# Patient Record
Sex: FEMALE | HISPANIC OR LATINO | Employment: UNEMPLOYED | URBAN - METROPOLITAN AREA
[De-identification: names, ages, dates, MRNs, and addresses within clinical notes are randomized per-mention and may not be internally consistent; named-entity substitution may affect disease eponyms.]

---

## 2021-01-25 ENCOUNTER — APPOINTMENT (OUTPATIENT)
Dept: RADIOLOGY | Facility: MEDICAL CENTER | Age: 31
End: 2021-01-25
Attending: EMERGENCY MEDICINE
Payer: MEDICAID

## 2021-01-25 ENCOUNTER — HOSPITAL ENCOUNTER (EMERGENCY)
Facility: MEDICAL CENTER | Age: 31
End: 2021-01-25
Attending: EMERGENCY MEDICINE
Payer: MEDICAID

## 2021-01-25 VITALS
OXYGEN SATURATION: 99 % | WEIGHT: 158.29 LBS | HEIGHT: 62 IN | DIASTOLIC BLOOD PRESSURE: 69 MMHG | HEART RATE: 64 BPM | TEMPERATURE: 97.3 F | RESPIRATION RATE: 16 BRPM | SYSTOLIC BLOOD PRESSURE: 93 MMHG | BODY MASS INDEX: 29.13 KG/M2

## 2021-01-25 DIAGNOSIS — O20.0 ABORTION, THREATENED: ICD-10-CM

## 2021-01-25 LAB
ALBUMIN SERPL BCP-MCNC: 4.5 G/DL (ref 3.2–4.9)
ALBUMIN/GLOB SERPL: 1.7 G/DL
ALP SERPL-CCNC: 43 U/L (ref 30–99)
ALT SERPL-CCNC: 11 U/L (ref 2–50)
ANION GAP SERPL CALC-SCNC: 11 MMOL/L (ref 7–16)
APPEARANCE UR: CLEAR
AST SERPL-CCNC: 12 U/L (ref 12–45)
B-HCG SERPL-ACNC: ABNORMAL MIU/ML (ref 0–10)
BASOPHILS # BLD AUTO: 0.6 % (ref 0–1.8)
BASOPHILS # BLD: 0.03 K/UL (ref 0–0.12)
BILIRUB SERPL-MCNC: 0.4 MG/DL (ref 0.1–1.5)
BILIRUB UR QL STRIP.AUTO: NEGATIVE
BUN SERPL-MCNC: 8 MG/DL (ref 8–22)
CALCIUM SERPL-MCNC: 9.3 MG/DL (ref 8.4–10.2)
CHLORIDE SERPL-SCNC: 104 MMOL/L (ref 96–112)
CO2 SERPL-SCNC: 22 MMOL/L (ref 20–33)
COLOR UR: YELLOW
CREAT SERPL-MCNC: 0.52 MG/DL (ref 0.5–1.4)
EOSINOPHIL # BLD AUTO: 0.02 K/UL (ref 0–0.51)
EOSINOPHIL NFR BLD: 0.4 % (ref 0–6.9)
EPI CELLS #/AREA URNS HPF: ABNORMAL /HPF
ERYTHROCYTE [DISTWIDTH] IN BLOOD BY AUTOMATED COUNT: 44 FL (ref 35.9–50)
GLOBULIN SER CALC-MCNC: 2.7 G/DL (ref 1.9–3.5)
GLUCOSE SERPL-MCNC: 92 MG/DL (ref 65–99)
GLUCOSE UR STRIP.AUTO-MCNC: NEGATIVE MG/DL
HCT VFR BLD AUTO: 44.3 % (ref 37–47)
HGB BLD-MCNC: 14.9 G/DL (ref 12–16)
IMM GRANULOCYTES # BLD AUTO: 0.01 K/UL (ref 0–0.11)
IMM GRANULOCYTES NFR BLD AUTO: 0.2 % (ref 0–0.9)
KETONES UR STRIP.AUTO-MCNC: NEGATIVE MG/DL
LEUKOCYTE ESTERASE UR QL STRIP.AUTO: NEGATIVE
LYMPHOCYTES # BLD AUTO: 2.16 K/UL (ref 1–4.8)
LYMPHOCYTES NFR BLD: 40.5 % (ref 22–41)
MCH RBC QN AUTO: 29.7 PG (ref 27–33)
MCHC RBC AUTO-ENTMCNC: 33.6 G/DL (ref 33.6–35)
MCV RBC AUTO: 88.2 FL (ref 81.4–97.8)
MICRO URNS: ABNORMAL
MONOCYTES # BLD AUTO: 0.41 K/UL (ref 0–0.85)
MONOCYTES NFR BLD AUTO: 7.7 % (ref 0–13.4)
MUCOUS THREADS #/AREA URNS HPF: ABNORMAL /HPF
NEUTROPHILS # BLD AUTO: 2.7 K/UL (ref 2–7.15)
NEUTROPHILS NFR BLD: 50.6 % (ref 44–72)
NITRITE UR QL STRIP.AUTO: NEGATIVE
NRBC # BLD AUTO: 0 K/UL
NRBC BLD-RTO: 0 /100 WBC
NUMBER OF RH DOSES IND 8505RD: NORMAL
PH UR STRIP.AUTO: 6.5 [PH] (ref 5–8)
PLATELET # BLD AUTO: 251 K/UL (ref 164–446)
PMV BLD AUTO: 9.6 FL (ref 9–12.9)
POTASSIUM SERPL-SCNC: 4.1 MMOL/L (ref 3.6–5.5)
PROT SERPL-MCNC: 7.2 G/DL (ref 6–8.2)
PROT UR QL STRIP: NEGATIVE MG/DL
RBC # BLD AUTO: 5.02 M/UL (ref 4.2–5.4)
RBC # URNS HPF: ABNORMAL /HPF
RBC UR QL AUTO: ABNORMAL
RH BLD: NORMAL
SODIUM SERPL-SCNC: 137 MMOL/L (ref 135–145)
SP GR UR STRIP.AUTO: 1.01
WBC # BLD AUTO: 5.3 K/UL (ref 4.8–10.8)
WBC #/AREA URNS HPF: ABNORMAL /HPF

## 2021-01-25 PROCEDURE — 80053 COMPREHEN METABOLIC PANEL: CPT

## 2021-01-25 PROCEDURE — 81001 URINALYSIS AUTO W/SCOPE: CPT

## 2021-01-25 PROCEDURE — 85025 COMPLETE CBC W/AUTO DIFF WBC: CPT

## 2021-01-25 PROCEDURE — 36415 COLL VENOUS BLD VENIPUNCTURE: CPT

## 2021-01-25 PROCEDURE — 86901 BLOOD TYPING SEROLOGIC RH(D): CPT

## 2021-01-25 PROCEDURE — 76801 OB US < 14 WKS SINGLE FETUS: CPT

## 2021-01-25 PROCEDURE — 99284 EMERGENCY DEPT VISIT MOD MDM: CPT

## 2021-01-25 PROCEDURE — 84702 CHORIONIC GONADOTROPIN TEST: CPT

## 2021-01-25 NOTE — ED TRIAGE NOTES
Pregnant and vaginal bleeding started Saturday. Spotting only now and no cramping. Patient to bathroom for urine sample.

## 2021-01-25 NOTE — ED NOTES
1432:  PIV placed in LAC, blood drawn and sent to lab.  Pt updated on POC including pending tests and chart review by ERP.  Pt denies any needs at this time.

## 2021-01-25 NOTE — ED PROVIDER NOTES
"ED Provider Note    CHIEF COMPLAINT  Chief Complaint   Patient presents with   • Pregnancy Problem     Vaginal bleeding started this weekend  LMP - 12-15-20       [facility]  Frances Rosen is a 30 y.o. female who presents for evaluation of pregnancy and bleeding.  The patient is G2, P0 Ab1 whose LMP was 12/5/2020.  Patient states that over the last couple days she has been having some vaginal bleeding.  She has not passed any large clots or tissue that she is aware of.  She has had 1 previous miscarriage.  The patient has had some polyuria but no dysuria.  She denies: Fever, chills, URI symptoms, cardiorespiratory symptoms, gastrointestinal symptoms.  No other acute symptomatology or complaints.    REVIEW OF SYSTEMS  See [facility] for further details.  She denies major health problems such as: Hypertension, diabetes, seizures, cardiopulmonary disorders, gastrointestinal disorders, genitourinary disorders.  All other systems negative.    PAST MEDICAL HISTORY  History reviewed. No pertinent past medical history.    FAMILY HISTORY  History reviewed. No pertinent family history.    SOCIAL HISTORY  No history of: tobacco, alcohol, drug abuse    SURGICAL HISTORY  History reviewed. No pertinent surgical history.    CURRENT MEDICATIONS  See nurses notes    ALLERGIES  No Known Allergies    PHYSICAL EXAM  VITAL SIGNS: /64   Pulse 72   Temp 36.3 °C (97.3 °F) (Temporal)   Resp 16   Ht 1.575 m (5' 2\")   Wt 71.8 kg (158 lb 4.6 oz)   LMP 12/15/2020   SpO2 100%   BMI 28.95 kg/m²    Constitutional: 30-year-old female, well developed, Well nourished, No acute distress, Non-toxic appearance.   HENT: ,Atraumatic, Bilateral external ears normal, tympanic membranes clear, Oropharynx moist, No oral exudates, Nose normal.   Eyes: PERRL, EOMI, Conjunctiva normal, No discharge.   Neck: Normal range of motion, No tenderness, Supple, No stridor.   Lymphatic: No lymphadenopathy noted.   Cardiovascular: Normal heart rate, Normal " rhythm, No murmurs, No rubs, No gallops.   Thorax & Lungs: Normal Equal breath sounds, No respiratory distress, No wheezing, no stridor, no rales. No chest tenderness.   Abdomen: Soft, nontender, nondistended, no organomegaly, positive bowel sounds normal in quality. No guarding or rebound.  No adnexal tenderness; uterus is nonenlarged;  Skin: Good skin turgor, pink, warm, dry. No rashes, petechiae, purpura. Normal capillary refill.   Back: No tenderness, No CVA tenderness.   Extremities: Intact distal pulses, No edema, No tenderness, No cyanosis, No clubbing. Vascular: Pulses are 2+, symmetric in the upper and lower extremities.  Musculoskeletal: Good range of motion in all major joints. No tenderness to palpation or major deformities noted.   Neurologic: Alert & oriented x 3, Normal motor function, Normal sensory function, No gross focal deficits noted.   Psychiatric: Affect normal, Judgment normal, Mood normal.     RADIOLOGY/PROCEDURES  US-OB 1ST TRIMESTER WITH TRANSVAGINAL (COMBO)   Final Result      Viable single intrauterine gestation of an estimated menstrual age of 6 weeks 0 days.      Slightly heterogeneous area adjacent to the gestational sac likely representing a small subchorionic hemorrhage.          COURSE & MEDICAL DECISION MAKING  Pertinent Labs & Imaging studies reviewed. (See chart for details)  Laboratory studies: CBC shows white count 5.3, 50% neutrophils, 40% lymphocytes, 7% monocytes, hemoglobin 14.8 hematocrit 44.3; CMP within normal; Rh+; urinalysis is negative for nitrite and leukocyte esterase, WBC 0-2, RBCs 2-5, epithelial cells rare; quantitative beta-hCG is 16,447;    Discussion: At this time, the patient presents with pregnancy and bleeding.  The patient has a viable 6-week intrauterine pregnancy identified on ultrasonography.  She is Rh+.  At this time, I discussed the findings with the patient.  We will have the patient get a repeat quantitative beta-hCG in 48 to 72 hours and  follow-up with OB/GYN or primary care for the results.  She is scheduled to go to the pregnancy center.  She was instructed that should she have change or worsening of symptoms or any please bleeding or passage of tissue where she feels she needs to be evaluated she should be seen at [facility] as we do not have consistent OB/GYN follow-up at this institution.  I have discussed the findings and treatment plan with the patient.  She indicates she is comfortable with this explanation disposition.    FINAL IMPRESSION  1. , threatened        PLAN  1.  Appropriate discharge instructions given  2.  Follow-up for repeat quantitative beta-hCG  3.  Follow-up with pregnancy center for further evaluation    Electronically signed by: Guy G Gansert, M.D., 2021 2:39 PM

## 2021-01-25 NOTE — ED NOTES
Med rec updated and complete  Allergies reviewed  Interviewed pt with boyfriend at bedside with permission from pt.  Pt reports no prescription medications.  Pt reports no antibiotics in the last 2 weeks

## 2021-01-26 NOTE — ED NOTES
Pt denies c/o pain at this time.  Reviewed discharge instructions w/ pt and family member, verbalized understanding to information provided including follow up care in two days for repeat blood work and return precautions.  Pt and family member denied questions/concerns.  Pt ambulated from ED w/ family member, denied c/o pain at time of discharge.

## 2021-01-29 ENCOUNTER — HOSPITAL ENCOUNTER (OUTPATIENT)
Dept: LAB | Facility: MEDICAL CENTER | Age: 31
End: 2021-01-29
Attending: EMERGENCY MEDICINE
Payer: MEDICAID

## 2021-01-29 LAB — B-HCG SERPL-ACNC: ABNORMAL MIU/ML (ref 0–5)

## 2021-01-29 PROCEDURE — 84702 CHORIONIC GONADOTROPIN TEST: CPT

## 2021-01-29 PROCEDURE — 36415 COLL VENOUS BLD VENIPUNCTURE: CPT

## 2021-02-08 ENCOUNTER — HOSPITAL ENCOUNTER (EMERGENCY)
Facility: MEDICAL CENTER | Age: 31
End: 2021-02-08
Attending: EMERGENCY MEDICINE
Payer: MEDICAID

## 2021-02-08 ENCOUNTER — APPOINTMENT (OUTPATIENT)
Dept: RADIOLOGY | Facility: MEDICAL CENTER | Age: 31
End: 2021-02-08
Payer: MEDICAID

## 2021-02-08 VITALS
RESPIRATION RATE: 18 BRPM | BODY MASS INDEX: 28.8 KG/M2 | DIASTOLIC BLOOD PRESSURE: 73 MMHG | SYSTOLIC BLOOD PRESSURE: 125 MMHG | WEIGHT: 156.53 LBS | HEART RATE: 75 BPM | HEIGHT: 62 IN | TEMPERATURE: 98.5 F | OXYGEN SATURATION: 97 %

## 2021-02-08 DIAGNOSIS — O41.8X10 SUBCHORIONIC HEMORRHAGE OF PLACENTA IN FIRST TRIMESTER, SINGLE OR UNSPECIFIED FETUS: ICD-10-CM

## 2021-02-08 DIAGNOSIS — O46.8X1 SUBCHORIONIC HEMORRHAGE OF PLACENTA IN FIRST TRIMESTER, SINGLE OR UNSPECIFIED FETUS: ICD-10-CM

## 2021-02-08 LAB
ALBUMIN SERPL BCP-MCNC: 4.3 G/DL (ref 3.2–4.9)
ALBUMIN/GLOB SERPL: 1.7 G/DL
ALP SERPL-CCNC: 36 U/L (ref 30–99)
ALT SERPL-CCNC: 14 U/L (ref 2–50)
ANION GAP SERPL CALC-SCNC: 7 MMOL/L (ref 7–16)
APPEARANCE UR: CLEAR
AST SERPL-CCNC: 10 U/L (ref 12–45)
B-HCG SERPL-ACNC: ABNORMAL MIU/ML (ref 0–5)
BACTERIA GENITAL QL WET PREP: NORMAL
BASOPHILS # BLD AUTO: 0.5 % (ref 0–1.8)
BASOPHILS # BLD: 0.03 K/UL (ref 0–0.12)
BILIRUB SERPL-MCNC: 0.2 MG/DL (ref 0.1–1.5)
BILIRUB UR QL STRIP.AUTO: NEGATIVE
BUN SERPL-MCNC: 9 MG/DL (ref 8–22)
CALCIUM SERPL-MCNC: 9.3 MG/DL (ref 8.5–10.5)
CHLORIDE SERPL-SCNC: 101 MMOL/L (ref 96–112)
CO2 SERPL-SCNC: 23 MMOL/L (ref 20–33)
COLOR UR: YELLOW
CREAT SERPL-MCNC: 0.44 MG/DL (ref 0.5–1.4)
EOSINOPHIL # BLD AUTO: 0.02 K/UL (ref 0–0.51)
EOSINOPHIL NFR BLD: 0.3 % (ref 0–6.9)
ERYTHROCYTE [DISTWIDTH] IN BLOOD BY AUTOMATED COUNT: 43.8 FL (ref 35.9–50)
GLOBULIN SER CALC-MCNC: 2.5 G/DL (ref 1.9–3.5)
GLUCOSE SERPL-MCNC: 93 MG/DL (ref 65–99)
GLUCOSE UR STRIP.AUTO-MCNC: NEGATIVE MG/DL
HCT VFR BLD AUTO: 43.1 % (ref 37–47)
HGB BLD-MCNC: 14.7 G/DL (ref 12–16)
IMM GRANULOCYTES # BLD AUTO: 0.02 K/UL (ref 0–0.11)
IMM GRANULOCYTES NFR BLD AUTO: 0.3 % (ref 0–0.9)
KETONES UR STRIP.AUTO-MCNC: NEGATIVE MG/DL
LEUKOCYTE ESTERASE UR QL STRIP.AUTO: NEGATIVE
LYMPHOCYTES # BLD AUTO: 1.86 K/UL (ref 1–4.8)
LYMPHOCYTES NFR BLD: 31.9 % (ref 22–41)
MCH RBC QN AUTO: 29.9 PG (ref 27–33)
MCHC RBC AUTO-ENTMCNC: 34.1 G/DL (ref 33.6–35)
MCV RBC AUTO: 87.8 FL (ref 81.4–97.8)
MICRO URNS: NORMAL
MONOCYTES # BLD AUTO: 0.43 K/UL (ref 0–0.85)
MONOCYTES NFR BLD AUTO: 7.4 % (ref 0–13.4)
NEUTROPHILS # BLD AUTO: 3.47 K/UL (ref 2–7.15)
NEUTROPHILS NFR BLD: 59.6 % (ref 44–72)
NITRITE UR QL STRIP.AUTO: NEGATIVE
NRBC # BLD AUTO: 0 K/UL
NRBC BLD-RTO: 0 /100 WBC
NUMBER OF RH DOSES IND 8505RD: NORMAL
PH UR STRIP.AUTO: 7 [PH] (ref 5–8)
PLATELET # BLD AUTO: 278 K/UL (ref 164–446)
PMV BLD AUTO: 9.9 FL (ref 9–12.9)
POTASSIUM SERPL-SCNC: 3.5 MMOL/L (ref 3.6–5.5)
PROT SERPL-MCNC: 6.8 G/DL (ref 6–8.2)
PROT UR QL STRIP: NEGATIVE MG/DL
RBC # BLD AUTO: 4.91 M/UL (ref 4.2–5.4)
RBC UR QL AUTO: NEGATIVE
RH BLD: NORMAL
SIGNIFICANT IND 70042: NORMAL
SITE SITE: NORMAL
SODIUM SERPL-SCNC: 131 MMOL/L (ref 135–145)
SOURCE SOURCE: NORMAL
SP GR UR STRIP.AUTO: 1.01
UROBILINOGEN UR STRIP.AUTO-MCNC: 0.2 MG/DL
WBC # BLD AUTO: 5.8 K/UL (ref 4.8–10.8)

## 2021-02-08 PROCEDURE — 85025 COMPLETE CBC W/AUTO DIFF WBC: CPT

## 2021-02-08 PROCEDURE — 84702 CHORIONIC GONADOTROPIN TEST: CPT

## 2021-02-08 PROCEDURE — 87591 N.GONORRHOEAE DNA AMP PROB: CPT

## 2021-02-08 PROCEDURE — 81003 URINALYSIS AUTO W/O SCOPE: CPT

## 2021-02-08 PROCEDURE — 80053 COMPREHEN METABOLIC PANEL: CPT

## 2021-02-08 PROCEDURE — 86901 BLOOD TYPING SEROLOGIC RH(D): CPT

## 2021-02-08 PROCEDURE — 87491 CHLMYD TRACH DNA AMP PROBE: CPT

## 2021-02-08 PROCEDURE — 76801 OB US < 14 WKS SINGLE FETUS: CPT

## 2021-02-08 PROCEDURE — 99284 EMERGENCY DEPT VISIT MOD MDM: CPT

## 2021-02-08 ASSESSMENT — FIBROSIS 4 INDEX: FIB4 SCORE: 0.45

## 2021-02-08 ASSESSMENT — LIFESTYLE VARIABLES: DO YOU DRINK ALCOHOL: NO

## 2021-02-08 NOTE — ED NOTES
Pt ambulatory with steady gait to P81, pt in gown and on gurney, call light in reach, chart up for ERP.

## 2021-02-08 NOTE — ED NOTES
Discharged home with s/o. Pt to call and schedule follow up with pregnancy center. Return to ed with worsening symptoms or concerns.

## 2021-02-08 NOTE — ED PROVIDER NOTES
ED Provider Note    Scribed for Maryam Bhardwaj M.D. by Jaylen White. 2021  2:59 PM    Primary care provider: Pcp Pt States None  Means of arrival: Walk-in  History obtained from: Patient  History limited by: None    CHIEF COMPLAINT  Chief Complaint   Patient presents with   • Pregnancy     8 weeks   • Vaginal Bleeding     a few hours ago, has NOT been soaking pads       HPI  Frances Rosen is a 30 y.o. female who presents to the Emergency Department complaining of vaginal bleeding onset last night. She is eight weeks pregnant, A1. She had a miscarriage early on in her last pregnancy. She reports she passed some tissue and bright red blood last night. She is still bleeding but at a much slower rate now. She denies any dizziness, lightheadedness, or pelvic pain.    PPE Note: I personally donned full PPE for all patient encounters during this visit, including wearing an N95 respirator mask, gloves, gown, and goggles.     Scribe remained outside the patient's room and did not have any contact with the patient for the duration of patient encounter.     REVIEW OF SYSTEMS  : Positive vaginal bleeding. No pelvic pain.  Neuro: No dizziness or lightheadedness    See history of present illness. All other systems reviewed and negative. C.    PAST MEDICAL HISTORY   None noted    SURGICAL HISTORY  patient denies any surgical history    SOCIAL HISTORY  Social History     Tobacco Use   • Smoking status: Never Smoker   Substance Use Topics   • Alcohol use: Not Currently   • Drug use: Never      Social History     Substance and Sexual Activity   Drug Use Never       FAMILY HISTORY  No family history on file.    CURRENT MEDICATIONS  Home Medications     Reviewed by Ariana Hope R.N. (Registered Nurse) on 21 at 1348  Med List Status: <None>   Medication Last Dose Status   Prenatal Vit-Fe Fumarate-FA (PRENATAL PO)  Active                ALLERGIES  No Known Allergies    PHYSICAL EXAM  VITAL SIGNS: BP  "123/73   Pulse 69   Temp 37.1 °C (98.7 °F) (Temporal)   Resp 16   Ht 1.575 m (5' 2\")   Wt 71 kg (156 lb 8.4 oz)   LMP 12/15/2020   SpO2 98%   BMI 28.63 kg/m²     Constitutional: Well developed, Well nourished, No acute distress, Non-toxic appearance.   HEENT: Normocephalic, Atraumatic,  external ears normal, pharynx pink,  Mucous  Membranes moist, No rhinorrhea or mucosal edema  Eyes: PERRL, EOMI, Conjunctiva normal, No discharge.   Neck: Normal range of motion, No tenderness, Supple, No stridor.   Lymphatic: No lymphadenopathy    Cardiovascular: Regular Rate and Rhythm, No murmurs,  rubs, or gallops.   Thorax & Lungs: Lungs clear to auscultation bilaterally, No respiratory distress, No wheezes, rhales or rhonchi, No chest wall tenderness.   Abdomen: Bowel sounds normal, Soft, non tender, non distended,  No pulsatile masses., no rebound guarding or peritoneal signs.   : Os open about finger width with what appears to be a mucous plug inside.  Skin: Warm, Dry, No erythema, No rash,   Back:  No CVA tenderness,  No spinal tenderness, bony crepitance step offs or instability.   Neurologic: Alert & oriented x 3, Normal motor function, Normal sensory function, No focal deficits noted. Normal reflexes.  Normal Cranial Nerves.  Extremities: Equal, intact distal pulses, No cyanosis, clubbing or edema,  No tenderness.   Musculoskeletal: Good range of motion in all major joints. No tenderness to palpation or major deformities noted.       DIAGNOSTIC STUDIES / PROCEDURES    LABS  Results for orders placed or performed during the hospital encounter of 02/08/21   CBC WITH DIFFERENTIAL   Result Value Ref Range    WBC 5.8 4.8 - 10.8 K/uL    RBC 4.91 4.20 - 5.40 M/uL    Hemoglobin 14.7 12.0 - 16.0 g/dL    Hematocrit 43.1 37.0 - 47.0 %    MCV 87.8 81.4 - 97.8 fL    MCH 29.9 27.0 - 33.0 pg    MCHC 34.1 33.6 - 35.0 g/dL    RDW 43.8 35.9 - 50.0 fL    Platelet Count 278 164 - 446 K/uL    MPV 9.9 9.0 - 12.9 fL    " Neutrophils-Polys 59.60 44.00 - 72.00 %    Lymphocytes 31.90 22.00 - 41.00 %    Monocytes 7.40 0.00 - 13.40 %    Eosinophils 0.30 0.00 - 6.90 %    Basophils 0.50 0.00 - 1.80 %    Immature Granulocytes 0.30 0.00 - 0.90 %    Nucleated RBC 0.00 /100 WBC    Neutrophils (Absolute) 3.47 2.00 - 7.15 K/uL    Lymphs (Absolute) 1.86 1.00 - 4.80 K/uL    Monos (Absolute) 0.43 0.00 - 0.85 K/uL    Eos (Absolute) 0.02 0.00 - 0.51 K/uL    Baso (Absolute) 0.03 0.00 - 0.12 K/uL    Immature Granulocytes (abs) 0.02 0.00 - 0.11 K/uL    NRBC (Absolute) 0.00 K/uL   COMP METABOLIC PANEL   Result Value Ref Range    Sodium 131 (L) 135 - 145 mmol/L    Potassium 3.5 (L) 3.6 - 5.5 mmol/L    Chloride 101 96 - 112 mmol/L    Co2 23 20 - 33 mmol/L    Anion Gap 7.0 7.0 - 16.0    Glucose 93 65 - 99 mg/dL    Bun 9 8 - 22 mg/dL    Creatinine 0.44 (L) 0.50 - 1.40 mg/dL    Calcium 9.3 8.5 - 10.5 mg/dL    AST(SGOT) 10 (L) 12 - 45 U/L    ALT(SGPT) 14 2 - 50 U/L    Alkaline Phosphatase 36 30 - 99 U/L    Total Bilirubin 0.2 0.1 - 1.5 mg/dL    Albumin 4.3 3.2 - 4.9 g/dL    Total Protein 6.8 6.0 - 8.2 g/dL    Globulin 2.5 1.9 - 3.5 g/dL    A-G Ratio 1.7 g/dL   RH TYPE FOR RHOGAM FROM E.D.   Result Value Ref Range    Emergency Department Rh Typing POS     Number Of Rh Doses Indicated ZERO    HCG QUANTITATIVE   Result Value Ref Range    [facility] 25953.0 (H) 0.0 - 5.0 mIU/mL   URINALYSIS,CULTURE IF INDICATED    Specimen: Blood   Result Value Ref Range    Color Yellow     Character Clear     Specific Gravity 1.006 <1.035    Ph 7.0 5.0 - 8.0    Glucose Negative Negative mg/dL    Ketones Negative Negative mg/dL    Protein Negative Negative mg/dL    Bilirubin Negative Negative    Urobilinogen, Urine 0.2 Negative    Nitrite Negative Negative    Leukocyte Esterase Negative Negative    Occult Blood Negative Negative    Micro Urine Req see below    ESTIMATED GFR   Result Value Ref Range    GFR If African American >60 >60 mL/min/1.73 m 2    GFR If Non  >60 >60  mL/min/1.73 m 2   WET PREP    Specimen: Vaginal; Genital   Result Value Ref Range    Significant Indicator NEG     Source GEN     Site VAGINAL     Wet Prep For Parasites       No yeast.  No motile Trichomonas seen.  No clue cells seen.     CHLAMYDIA & GC BY PCR    Specimen: Genital   Result Value Ref Range    Source Cervical       All labs reviewed by me.    RADIOLOGY  US-OB 1ST TRIMESTER WITH TRANSVAGINAL (COMBO)   Final Result      1.  Viable single intrauterine gestation of an estimated gestational age of 8 weeks 3 days with an GURPREET of 9/17/2021.   2.  There is a small perigestational hemorrhage again seen.        The radiologist's interpretation of all radiological studies have been reviewed by me.    COURSE & MEDICAL DECISION MAKING  Nursing notes, VS, PMSFHx reviewed in chart.     2:59 PM - Patient seen and examined at bedside. Ordered US-OB 1st trimester w/ transvaginal, CBC w/ diff, CMP, HCG quants, Estimated GFR and UA, culture if indicated to evaluate her symptoms.     3:14 PM - Patient was reevaluated at bedside to perform pelvic exam. The differential diagnoses include, but are not limited to: threatened miscarriage, ectopic pregnancy, subchorionic hemorrhage.    3:40 PM - ordered Chlamydia & GC by PCR and wet prep for further evaluation.    3:45 PM - Patient was reevaluated at bedside. Discussed lab and radiology results which show no evidence of subchorionic hemorrhage and a healthy fetus. Her Rh is positive. She will be sent home with instructions to follow up with the pregnancy center. Patient verbalizes understanding and agreement to this plan.     The patient will return for new or worsening symptoms and is stable at the time of discharge.    Patient has had high blood pressure while in the emergency department, felt likely secondary to medical condition. Counseled patient to monitor blood pressure at home and follow up with primary care physician.    DISPOSITION:  Patient will be discharged home in  stable condition.    FOLLOW UP:  [facility]  [address] [address]  [place] [address]  [phone]    Call in 1 day  for recheck, to establish care      FINAL IMPRESSION  1. Subchorionic hemorrhage of placenta in first trimester, single or unspecified fetus          Jaylen TAVERAS (Scribaimee), am scribing for, and in the presence of, Maryam Bhardwaj M.D..    Electronically signed by: Jaylen White (Alyssaibaimee), 2/8/2021    Maryam TAVERAS M.D. personally performed the services described in this documentation, as scribed by Jaylen White in my presence, and it is both accurate and complete.    C.    The note accurately reflects work and decisions made by me.  Maryam Bhardwaj M.D.  2/8/2021  5:05 PM

## 2021-02-08 NOTE — ED TRIAGE NOTES
Pt ambs to triage  Chief Complaint   Patient presents with   • Pregnancy     8 weeks   • Vaginal Bleeding     a few hours ago, has NOT been soaking pads   Had same issue at 5 weeks was seen at [facility] with confirm IUP.  Has scheduled apt with OB on 2/26th.

## 2021-02-09 LAB
C TRACH DNA SPEC QL NAA+PROBE: NEGATIVE
N GONORRHOEA DNA SPEC QL NAA+PROBE: NEGATIVE
SPECIMEN SOURCE: NORMAL

## 2021-02-10 ENCOUNTER — APPOINTMENT (OUTPATIENT)
Dept: OBGYN | Facility: CLINIC | Age: 31
End: 2021-02-10
Payer: MEDICAID

## 2021-02-26 ENCOUNTER — HOSPITAL ENCOUNTER (OUTPATIENT)
Facility: MEDICAL CENTER | Age: 31
End: 2021-02-26
Attending: NURSE PRACTITIONER
Payer: MEDICAID

## 2021-02-26 ENCOUNTER — APPOINTMENT (OUTPATIENT)
Dept: OBGYN | Facility: CLINIC | Age: 31
End: 2021-02-26
Payer: MEDICAID

## 2021-02-26 ENCOUNTER — INITIAL PRENATAL (OUTPATIENT)
Dept: OBGYN | Facility: CLINIC | Age: 31
End: 2021-02-26
Payer: MEDICAID

## 2021-02-26 VITALS — DIASTOLIC BLOOD PRESSURE: 72 MMHG | WEIGHT: 150.9 LBS | SYSTOLIC BLOOD PRESSURE: 118 MMHG | BODY MASS INDEX: 27.6 KG/M2

## 2021-02-26 DIAGNOSIS — Z34.90 PRENATAL CARE, ANTEPARTUM: ICD-10-CM

## 2021-02-26 DIAGNOSIS — Z34.80 SUPERVISION OF OTHER NORMAL PREGNANCY, ANTEPARTUM: ICD-10-CM

## 2021-02-26 LAB
APPEARANCE UR: NORMAL
BILIRUB UR STRIP-MCNC: NORMAL MG/DL
COLOR UR AUTO: NORMAL
GLUCOSE UR STRIP.AUTO-MCNC: NEGATIVE MG/DL
KETONES UR STRIP.AUTO-MCNC: NORMAL MG/DL
LEUKOCYTE ESTERASE UR QL STRIP.AUTO: NEGATIVE
NITRITE UR QL STRIP.AUTO: NEGATIVE
PH UR STRIP.AUTO: 6 [PH] (ref 5–8)
PROT UR QL STRIP: NORMAL MG/DL
RBC UR QL AUTO: NEGATIVE
SP GR UR STRIP.AUTO: >=1.03
UROBILINOGEN UR STRIP-MCNC: NORMAL MG/DL

## 2021-02-26 PROCEDURE — 0500F INITIAL PRENATAL CARE VISIT: CPT | Performed by: NURSE PRACTITIONER

## 2021-02-26 PROCEDURE — 87591 N.GONORRHOEAE DNA AMP PROB: CPT

## 2021-02-26 PROCEDURE — 81002 URINALYSIS NONAUTO W/O SCOPE: CPT | Performed by: NURSE PRACTITIONER

## 2021-02-26 PROCEDURE — 88175 CYTOPATH C/V AUTO FLUID REDO: CPT

## 2021-02-26 PROCEDURE — 87491 CHLMYD TRACH DNA AMP PROBE: CPT

## 2021-02-26 ASSESSMENT — EDINBURGH POSTNATAL DEPRESSION SCALE (EPDS)
I HAVE BLAMED MYSELF UNNECESSARILY WHEN THINGS WENT WRONG: NOT VERY OFTEN
I HAVE BEEN ABLE TO LAUGH AND SEE THE FUNNY SIDE OF THINGS: AS MUCH AS I ALWAYS COULD
I HAVE FELT SCARED OR PANICKY FOR NO GOOD REASON: NO, NOT AT ALL
I HAVE BEEN SO UNHAPPY THAT I HAVE HAD DIFFICULTY SLEEPING: NOT AT ALL
TOTAL SCORE: 3
I HAVE BEEN ANXIOUS OR WORRIED FOR NO GOOD REASON: NO, NOT AT ALL
I HAVE BEEN SO UNHAPPY THAT I HAVE BEEN CRYING: ONLY OCCASIONALLY
I HAVE LOOKED FORWARD WITH ENJOYMENT TO THINGS: AS MUCH AS I EVER DID
I HAVE FELT SAD OR MISERABLE: NO, NOT AT ALL
THE THOUGHT OF HARMING MYSELF HAS OCCURRED TO ME: NEVER
THINGS HAVE BEEN GETTING ON TOP OF ME: NO, MOST OF THE TIME I HAVE COPED QUITE WELL

## 2021-02-26 ASSESSMENT — FIBROSIS 4 INDEX: FIB4 SCORE: 0.3

## 2021-02-26 NOTE — PROGRESS NOTES
NOB today  LMP: 12/15/20  Last pap: Today  [phone] (cell)   Pharmacy confirmed  On PNV  Cystic Fibrosis test declined  AFP declined  Flu vaccine declined

## 2021-02-26 NOTE — PROGRESS NOTES
Subjective:   Frances Rosen is a 30 y.o.  who presents for her new OB exam.  She is 10w3d with an GURPREET of Estimated Date of Delivery: 21 by LMP c/w with US. She is feeling well other than vomiting.  Denies VB, LOF, contractions or pain. No ER visits or previous care in this pregnancy. Denies dysuria, vaginal DC, fever. Reports unsure fetal movement. Declines AFP.  Declines CF.  Declines NIPT testing.     History reviewed. No pertinent past medical history.    Psych Hx: Patient denies any history of depression, anxiety, PTSD, bipolar or any other psychological issues.     EPDS today: 3    History reviewed. No pertinent surgical history.     OB History    Para Term  AB Living   2       1     SAB TAB Ectopic Molar Multiple Live Births   1                # Outcome Date GA Lbr Donnell/2nd Weight Sex Delivery Anes PTL Lv   2 Current            1 SAB 02/26/10              Obstetric Comments   Pregnancy unplanned but desired. FOB involved and supportive. Pt does work outside of the home at this time        Gynecological Hx: Denies any hx of STIs, including HSV. Denies any vulvovaginal disorders and reports hx of abnormal pap followed by normal. Last pap WNL unknown when     Sexual Hx: One current male partner, who is FOB     Contraceptive Hx: Has not used in the past and has since discontinued use.     History reviewed. No pertinent family history.  Denies any genetic disorders in family history.     Social History     Socioeconomic History   • Marital status: Single     Spouse name: Not on file   • Number of children: Not on file   • Years of education: Not on file   • Highest education level: Not on file   Occupational History   • Not on file   Tobacco Use   • Smoking status: Never Smoker   • Smokeless tobacco: Never Used   Substance and Sexual Activity   • Alcohol use: Not Currently   • Drug use: Never   • Sexual activity: Not Currently     Partners: Male     Birth control/protection:  None   Other Topics Concern   • Not on file   Social History Narrative   • Not on file     Social Determinants of Health     Financial Resource Strain:    • Difficulty of Paying Living Expenses:    Food Insecurity:    • Worried About Running Out of Food in the Last Year:    • Ran Out of Food in the Last Year:    Transportation Needs:    • Lack of Transportation (Medical):    • Lack of Transportation (Non-Medical):    Physical Activity:    • Days of Exercise per Week:    • Minutes of Exercise per Session:    Stress:    • Feeling of Stress :    Social Connections:    • Frequency of Communication with Friends and Family:    • Frequency of Social Gatherings with Friends and Family:    • Attends Baptism Services:    • Active Member of Clubs or Organizations:    • Attends Club or Organization Meetings:    • Marital Status:    Intimate Partner Violence:    • Fear of Current or Ex-Partner:    • Emotionally Abused:    • Physically Abused:    • Sexually Abused:        FOB is involved and does not lives with Frances Rosen. She lives by herself.   Pregnancy is unplanned but desired.    She is currently working at [employer], denies any heavy lifting or exposure to potential teratogens like environmental or occupational toxins.   Denies alcohol use, drug use, or tobacco use in pregnancy.     Current Medications: PNV   Allergies: Denies allergies to medications, food, or environmental allergies    Objective:      Vitals:    02/26/21 0830   BP: 118/72   Weight: 68.4 kg (150 lb 14.4 oz)        See Prenatal Physical and Prenatal Vitals  UA WNL today      Assessment:      1.  IUP @ 10w3d per LMP      2.  S=D      3.  See problem list as follows       Patient Active Problem List    Diagnosis Date Noted   • Supervision of other normal pregnancy, antepartum 02/26/2021         Plan:   - GC/CT & pap done today   - Reviewed natural remedies for n/v as declines meds today  - Reviewed continuing pelvic rest for subchorionic  hemorrhage   - Prenatal labs ordered - lab slip given  - Discussed PNV, nutrition, adequate water intake, and exercise/weight gain in pregnancy  - NOB informational packet with anticipatory guidance given  - Information on Centering Pregnancy given, groups cancelled until further notice due to COVID-19   - S/sx of pregnancy warning signs and PTL precautions given  - Complete OB US in 10 wks  - Return to [facility] in 4 wks

## 2021-02-26 NOTE — LETTER
February 26, 2021      Frances Rosen is currently pregnant and being cared for by [facility]. She had her appt today at at 8:00a ending at 9:30a. Thank you for understanding.         Thank you,          JESUS Medel    Electronically Signed

## 2021-02-27 DIAGNOSIS — Z34.90 PRENATAL CARE, ANTEPARTUM: ICD-10-CM

## 2021-03-01 LAB
C TRACH DNA GENITAL QL NAA+PROBE: NEGATIVE
CYTOLOGY REG CYTOL: NORMAL
N GONORRHOEA DNA GENITAL QL NAA+PROBE: NEGATIVE
SPECIMEN SOURCE: NORMAL

## 2021-04-06 ENCOUNTER — ROUTINE PRENATAL (OUTPATIENT)
Dept: OBGYN | Facility: CLINIC | Age: 31
End: 2021-04-06
Payer: MEDICAID

## 2021-04-06 ENCOUNTER — HOSPITAL ENCOUNTER (OUTPATIENT)
Dept: LAB | Facility: MEDICAL CENTER | Age: 31
End: 2021-04-06
Attending: NURSE PRACTITIONER
Payer: MEDICAID

## 2021-04-06 VITALS — BODY MASS INDEX: 27.44 KG/M2 | WEIGHT: 150 LBS | SYSTOLIC BLOOD PRESSURE: 112 MMHG | DIASTOLIC BLOOD PRESSURE: 64 MMHG

## 2021-04-06 DIAGNOSIS — Z34.90 PRENATAL CARE, ANTEPARTUM: ICD-10-CM

## 2021-04-06 DIAGNOSIS — Z34.80 SUPERVISION OF OTHER NORMAL PREGNANCY, ANTEPARTUM: ICD-10-CM

## 2021-04-06 LAB
ABO GROUP BLD: NORMAL
BLD GP AB SCN SERPL QL: NORMAL
RH BLD: NORMAL

## 2021-04-06 PROCEDURE — 90040 PR PRENATAL FOLLOW UP: CPT | Performed by: NURSE PRACTITIONER

## 2021-04-06 PROCEDURE — 86850 RBC ANTIBODY SCREEN: CPT

## 2021-04-06 PROCEDURE — 36415 COLL VENOUS BLD VENIPUNCTURE: CPT

## 2021-04-06 PROCEDURE — 87389 HIV-1 AG W/HIV-1&-2 AB AG IA: CPT

## 2021-04-06 PROCEDURE — 87340 HEPATITIS B SURFACE AG IA: CPT

## 2021-04-06 PROCEDURE — 86762 RUBELLA ANTIBODY: CPT

## 2021-04-06 PROCEDURE — 80307 DRUG TEST PRSMV CHEM ANLYZR: CPT

## 2021-04-06 PROCEDURE — 81003 URINALYSIS AUTO W/O SCOPE: CPT | Mod: XU

## 2021-04-06 PROCEDURE — 86901 BLOOD TYPING SEROLOGIC RH(D): CPT

## 2021-04-06 PROCEDURE — 86803 HEPATITIS C AB TEST: CPT

## 2021-04-06 PROCEDURE — 86900 BLOOD TYPING SEROLOGIC ABO: CPT

## 2021-04-06 PROCEDURE — 86780 TREPONEMA PALLIDUM: CPT

## 2021-04-06 PROCEDURE — 85025 COMPLETE CBC W/AUTO DIFF WBC: CPT

## 2021-04-06 ASSESSMENT — FIBROSIS 4 INDEX: FIB4 SCORE: 0.3

## 2021-04-06 NOTE — PROGRESS NOTES
SUBJECTIVE:  Pt is a 30 y.o.   at 16w0d  gestation. Presents today for follow-up prenatal care. Reports no issues at this time.  Reports no fetal movement. Denies regular cramping/contractions, bleeding or leaking of fluid. Denies dysuria, headaches, N/V. Generally feels well today. Her morning sickness has improved some.      OBJECTIVE:  - See prenatal vitals flow  -   Vitals:    21 1042   BP: 112/64   Weight: 68 kg (150 lb)                 ASSESSMENT:   - IUP at 16w0d    - S=D   -   Patient Active Problem List    Diagnosis Date Noted   • Supervision of other normal pregnancy, antepartum 2021         PLAN:  - S/sx pregnancy and labor warning signs vs general discomforts discussed  - Fetal movements and/or kick counts reviewed   - Adequate hydration reinforced  - Nutrition/exercise/vitamin education; continue PNV  - US scheduled  - Will do lab work soon  - Declines AFP  - Declines Flu vacc today   - Anticipatory guidance given  - RTC in 4 weeks for follow-up prenatal care

## 2021-04-06 NOTE — PROGRESS NOTES
OB follow up   + fetal movement.  No VB, LOF or UC's.  Pt states she has no concerns at this time  [phone] (home)   Preferred pharmacy confirmed.

## 2021-04-07 LAB
APPEARANCE UR: CLEAR
BASOPHILS # BLD AUTO: 0.7 % (ref 0–1.8)
BASOPHILS # BLD: 0.03 K/UL (ref 0–0.12)
BILIRUB UR QL STRIP.AUTO: NEGATIVE
COLOR UR: YELLOW
EOSINOPHIL # BLD AUTO: 0.01 K/UL (ref 0–0.51)
EOSINOPHIL NFR BLD: 0.2 % (ref 0–6.9)
ERYTHROCYTE [DISTWIDTH] IN BLOOD BY AUTOMATED COUNT: 46 FL (ref 35.9–50)
GLUCOSE UR STRIP.AUTO-MCNC: NEGATIVE MG/DL
HBV SURFACE AG SER QL: ABNORMAL
HCT VFR BLD AUTO: 42.8 % (ref 37–47)
HCV AB SER QL: NORMAL
HGB BLD-MCNC: 14.1 G/DL (ref 12–16)
HIV 1+2 AB+HIV1 P24 AG SERPL QL IA: NORMAL
IMM GRANULOCYTES # BLD AUTO: 0.01 K/UL (ref 0–0.11)
IMM GRANULOCYTES NFR BLD AUTO: 0.2 % (ref 0–0.9)
KETONES UR STRIP.AUTO-MCNC: ABNORMAL MG/DL
LEUKOCYTE ESTERASE UR QL STRIP.AUTO: NEGATIVE
LYMPHOCYTES # BLD AUTO: 1.53 K/UL (ref 1–4.8)
LYMPHOCYTES NFR BLD: 35.1 % (ref 22–41)
MCH RBC QN AUTO: 29.8 PG (ref 27–33)
MCHC RBC AUTO-ENTMCNC: 32.9 G/DL (ref 33.6–35)
MCV RBC AUTO: 90.5 FL (ref 81.4–97.8)
MICRO URNS: ABNORMAL
MONOCYTES # BLD AUTO: 0.33 K/UL (ref 0–0.85)
MONOCYTES NFR BLD AUTO: 7.6 % (ref 0–13.4)
NEUTROPHILS # BLD AUTO: 2.45 K/UL (ref 2–7.15)
NEUTROPHILS NFR BLD: 56.2 % (ref 44–72)
NITRITE UR QL STRIP.AUTO: NEGATIVE
NRBC # BLD AUTO: 0 K/UL
NRBC BLD-RTO: 0 /100 WBC
PH UR STRIP.AUTO: 6.5 [PH] (ref 5–8)
PLATELET # BLD AUTO: 263 K/UL (ref 164–446)
PMV BLD AUTO: 10.3 FL (ref 9–12.9)
PROT UR QL STRIP: NEGATIVE MG/DL
RBC # BLD AUTO: 4.73 M/UL (ref 4.2–5.4)
RBC UR QL AUTO: NEGATIVE
RUBV AB SER QL: 233 IU/ML
SP GR UR STRIP.AUTO: 1.02
TREPONEMA PALLIDUM IGG+IGM AB [PRESENCE] IN SERUM OR PLASMA BY IMMUNOASSAY: ABNORMAL
UROBILINOGEN UR STRIP.AUTO-MCNC: 0.2 MG/DL
WBC # BLD AUTO: 4.4 K/UL (ref 4.8–10.8)

## 2021-04-08 PROBLEM — F12.90 MARIJUANA USE: Status: ACTIVE | Noted: 2021-04-08

## 2021-04-08 LAB
AMPHET CTO UR CFM-MCNC: NEGATIVE NG/ML
BARBITURATES CTO UR CFM-MCNC: NEGATIVE NG/ML
BENZODIAZ CTO UR CFM-MCNC: NEGATIVE NG/ML
CANNABINOIDS CTO UR CFM-MCNC: POSITIVE NG/ML
COCAINE CTO UR CFM-MCNC: NEGATIVE NG/ML
DRUG COMMENT 753798: NORMAL
METHADONE CTO UR CFM-MCNC: NEGATIVE NG/ML
OPIATES CTO UR CFM-MCNC: NEGATIVE NG/ML
PCP CTO UR CFM-MCNC: NEGATIVE NG/ML
PROPOXYPH CTO UR CFM-MCNC: NEGATIVE NG/ML

## 2021-04-10 LAB — THC UR CFM-MCNC: 183 NG/ML

## 2021-05-07 ENCOUNTER — ROUTINE PRENATAL (OUTPATIENT)
Dept: OBGYN | Facility: CLINIC | Age: 31
End: 2021-05-07
Payer: MEDICAID

## 2021-05-07 ENCOUNTER — APPOINTMENT (OUTPATIENT)
Dept: RADIOLOGY | Facility: IMAGING CENTER | Age: 31
End: 2021-05-07
Attending: NURSE PRACTITIONER
Payer: MEDICAID

## 2021-05-07 VITALS — BODY MASS INDEX: 28.73 KG/M2 | DIASTOLIC BLOOD PRESSURE: 60 MMHG | WEIGHT: 157.1 LBS | SYSTOLIC BLOOD PRESSURE: 120 MMHG

## 2021-05-07 DIAGNOSIS — Z34.90 PRENATAL CARE, ANTEPARTUM: ICD-10-CM

## 2021-05-07 DIAGNOSIS — Z34.80 SUPERVISION OF OTHER NORMAL PREGNANCY, ANTEPARTUM: ICD-10-CM

## 2021-05-07 PROCEDURE — 76805 OB US >/= 14 WKS SNGL FETUS: CPT | Mod: TC | Performed by: NURSE PRACTITIONER

## 2021-05-07 PROCEDURE — 90040 PR PRENATAL FOLLOW UP: CPT | Performed by: PHYSICIAN ASSISTANT

## 2021-05-07 ASSESSMENT — FIBROSIS 4 INDEX: FIB4 SCORE: 0.32

## 2021-05-07 NOTE — PROGRESS NOTES
Pt. Here for OB/FU. Reports Good FM.  U/S today   Good # [phone]  Pt states still having some vomiting.   Pharmacy verified.   Chaperone offered and not needed.   AFP declined on last visit.

## 2021-05-07 NOTE — PROGRESS NOTES
Pt has no complaints with cramping, bleeding or pain. +FM. Declines AFP - refusal signed today. PNL wnl - pt notified of results. UDS +marijuana - pt notes she quit when she found out about pregnancy. US to be done today. 1hr GTT next visit. Pt urged to incr water intake and notes she has had improving nausea, though occ vomiting. Gained 7 lb since last visit. RTC 4 wk or sooner prn.

## 2021-06-04 ENCOUNTER — ROUTINE PRENATAL (OUTPATIENT)
Dept: OBGYN | Facility: CLINIC | Age: 31
End: 2021-06-04
Payer: MEDICAID

## 2021-06-04 VITALS — BODY MASS INDEX: 30.05 KG/M2 | SYSTOLIC BLOOD PRESSURE: 112 MMHG | DIASTOLIC BLOOD PRESSURE: 60 MMHG | WEIGHT: 164.3 LBS

## 2021-06-04 DIAGNOSIS — Z34.80 SUPERVISION OF OTHER NORMAL PREGNANCY, ANTEPARTUM: ICD-10-CM

## 2021-06-04 PROCEDURE — 90040 PR PRENATAL FOLLOW UP: CPT | Performed by: PHYSICIAN ASSISTANT

## 2021-06-04 ASSESSMENT — FIBROSIS 4 INDEX: FIB4 SCORE: 0.32

## 2021-06-04 NOTE — PROGRESS NOTES
Pt has no complaints with cramping, bleeding or pain. +FM. US wnl - pt notified of results. 1hr GTT, CBC, T pallidium lab slip given today - pt aware. Pt to travel with partner to [place] for work x 1 month on 6/12, though will travel back after that. Travel precautions stressed today. RTC 4 wk or sooner prn.

## 2021-06-04 NOTE — PROGRESS NOTES
Pt. Here for OB/FU. Reports Good FM.   Good # [phone]  Pt. Denies VB, LOF, or UC's.   Pharmacy verified.   Chaperone offered and not needed.   Pt given 1 HR GTT, RPR and CBC lab slip today along with instructions.

## 2021-07-29 ENCOUNTER — HOSPITAL ENCOUNTER (OUTPATIENT)
Dept: LAB | Facility: MEDICAL CENTER | Age: 31
End: 2021-07-29
Attending: PHYSICIAN ASSISTANT
Payer: MEDICAID

## 2021-07-29 DIAGNOSIS — Z34.80 SUPERVISION OF OTHER NORMAL PREGNANCY, ANTEPARTUM: ICD-10-CM

## 2021-07-29 LAB
ERYTHROCYTE [DISTWIDTH] IN BLOOD BY AUTOMATED COUNT: 45.9 FL (ref 35.9–50)
GLUCOSE 1H P 50 G GLC PO SERPL-MCNC: 128 MG/DL (ref 70–139)
HCT VFR BLD AUTO: 42.1 % (ref 37–47)
HGB BLD-MCNC: 14 G/DL (ref 12–16)
MCH RBC QN AUTO: 29.9 PG (ref 27–33)
MCHC RBC AUTO-ENTMCNC: 33.3 G/DL (ref 33.6–35)
MCV RBC AUTO: 89.8 FL (ref 81.4–97.8)
PLATELET # BLD AUTO: 251 K/UL (ref 164–446)
PMV BLD AUTO: 10.7 FL (ref 9–12.9)
RBC # BLD AUTO: 4.69 M/UL (ref 4.2–5.4)
TREPONEMA PALLIDUM IGG+IGM AB [PRESENCE] IN SERUM OR PLASMA BY IMMUNOASSAY: NORMAL
WBC # BLD AUTO: 4.1 K/UL (ref 4.8–10.8)

## 2021-07-29 PROCEDURE — 82950 GLUCOSE TEST: CPT

## 2021-07-29 PROCEDURE — 85027 COMPLETE CBC AUTOMATED: CPT

## 2021-07-29 PROCEDURE — 36415 COLL VENOUS BLD VENIPUNCTURE: CPT

## 2021-07-29 PROCEDURE — 86780 TREPONEMA PALLIDUM: CPT

## 2021-08-08 ENCOUNTER — APPOINTMENT (OUTPATIENT)
Dept: RADIOLOGY | Facility: MEDICAL CENTER | Age: 31
End: 2021-08-08
Payer: MEDICAID

## 2021-08-08 ENCOUNTER — HOSPITAL ENCOUNTER (INPATIENT)
Facility: MEDICAL CENTER | Age: 31
LOS: 2 days | End: 2021-08-10
Attending: OBSTETRICS & GYNECOLOGY | Admitting: OBSTETRICS & GYNECOLOGY
Payer: MEDICAID

## 2021-08-08 LAB
A1 MICROGLOB PLACENTAL VAG QL: POSITIVE
APPEARANCE UR: CLEAR
APPEARANCE UR: CLEAR
BACTERIA #/AREA URNS HPF: NEGATIVE /HPF
BASOPHILS # BLD AUTO: 0.2 % (ref 0–1.8)
BASOPHILS # BLD: 0.03 K/UL (ref 0–0.12)
BILIRUB UR QL STRIP.AUTO: NEGATIVE
COLOR UR AUTO: YELLOW
COLOR UR: YELLOW
EOSINOPHIL # BLD AUTO: 0.02 K/UL (ref 0–0.51)
EOSINOPHIL NFR BLD: 0.1 % (ref 0–6.9)
EPI CELLS #/AREA URNS HPF: NEGATIVE /HPF
ERYTHROCYTE [DISTWIDTH] IN BLOOD BY AUTOMATED COUNT: 44.1 FL (ref 35.9–50)
GLUCOSE UR QL STRIP.AUTO: NEGATIVE MG/DL
GLUCOSE UR STRIP.AUTO-MCNC: NEGATIVE MG/DL
HCT VFR BLD AUTO: 41.7 % (ref 37–47)
HGB BLD-MCNC: 14.3 G/DL (ref 12–16)
HOLDING TUBE BB 8507: NORMAL
HYALINE CASTS #/AREA URNS LPF: NORMAL /LPF
IMM GRANULOCYTES # BLD AUTO: 0.06 K/UL (ref 0–0.11)
IMM GRANULOCYTES NFR BLD AUTO: 0.4 % (ref 0–0.9)
KETONES UR QL STRIP.AUTO: NEGATIVE MG/DL
KETONES UR STRIP.AUTO-MCNC: NEGATIVE MG/DL
LEUKOCYTE ESTERASE UR QL STRIP.AUTO: ABNORMAL
LEUKOCYTE ESTERASE UR QL STRIP.AUTO: ABNORMAL
LYMPHOCYTES # BLD AUTO: 1.68 K/UL (ref 1–4.8)
LYMPHOCYTES NFR BLD: 10.1 % (ref 22–41)
MCH RBC QN AUTO: 30 PG (ref 27–33)
MCHC RBC AUTO-ENTMCNC: 34.3 G/DL (ref 33.6–35)
MCV RBC AUTO: 87.6 FL (ref 81.4–97.8)
MICRO URNS: ABNORMAL
MONOCYTES # BLD AUTO: 1.15 K/UL (ref 0–0.85)
MONOCYTES NFR BLD AUTO: 6.9 % (ref 0–13.4)
NEUTROPHILS # BLD AUTO: 13.63 K/UL (ref 2–7.15)
NEUTROPHILS NFR BLD: 82.3 % (ref 44–72)
NITRITE UR QL STRIP.AUTO: NEGATIVE
NITRITE UR QL STRIP.AUTO: NEGATIVE
NRBC # BLD AUTO: 0 K/UL
NRBC BLD-RTO: 0 /100 WBC
PH UR STRIP.AUTO: 6.5 [PH] (ref 5–8)
PH UR STRIP.AUTO: 6.5 [PH] (ref 5–8)
PLATELET # BLD AUTO: 269 K/UL (ref 164–446)
PMV BLD AUTO: 10.4 FL (ref 9–12.9)
PROT UR QL STRIP: NEGATIVE MG/DL
PROT UR QL STRIP: NEGATIVE MG/DL
RBC # BLD AUTO: 4.76 M/UL (ref 4.2–5.4)
RBC # URNS HPF: NORMAL /HPF
RBC UR QL AUTO: ABNORMAL
RBC UR QL AUTO: NEGATIVE
SP GR UR STRIP.AUTO: 1.01
SP GR UR STRIP.AUTO: 1.01 (ref 1–1.03)
UROBILINOGEN UR STRIP.AUTO-MCNC: 0.2 MG/DL
WBC # BLD AUTO: 16.6 K/UL (ref 4.8–10.8)
WBC #/AREA URNS HPF: NORMAL /HPF

## 2021-08-08 PROCEDURE — 700105 HCHG RX REV CODE 258: Performed by: OBSTETRICS & GYNECOLOGY

## 2021-08-08 PROCEDURE — 0241U HCHG SARS-COV-2 COVID-19 NFCT DS RESP RNA 4 TRGT MIC: CPT

## 2021-08-08 PROCEDURE — 770002 HCHG ROOM/CARE - OB PRIVATE (112)

## 2021-08-08 PROCEDURE — 99284 EMERGENCY DEPT VISIT MOD MDM: CPT

## 2021-08-08 PROCEDURE — 700105 HCHG RX REV CODE 258

## 2021-08-08 PROCEDURE — 84112 EVAL AMNIOTIC FLUID PROTEIN: CPT

## 2021-08-08 PROCEDURE — 700111 HCHG RX REV CODE 636 W/ 250 OVERRIDE (IP): Performed by: OBSTETRICS & GYNECOLOGY

## 2021-08-08 PROCEDURE — 81002 URINALYSIS NONAUTO W/O SCOPE: CPT

## 2021-08-08 PROCEDURE — C9803 HOPD COVID-19 SPEC COLLECT: HCPCS | Performed by: OBSTETRICS & GYNECOLOGY

## 2021-08-08 PROCEDURE — 85025 COMPLETE CBC W/AUTO DIFF WBC: CPT

## 2021-08-08 PROCEDURE — 76819 FETAL BIOPHYS PROFIL W/O NST: CPT

## 2021-08-08 PROCEDURE — 88307 TISSUE EXAM BY PATHOLOGIST: CPT

## 2021-08-08 PROCEDURE — 81001 URINALYSIS AUTO W/SCOPE: CPT

## 2021-08-08 PROCEDURE — 36415 COLL VENOUS BLD VENIPUNCTURE: CPT

## 2021-08-08 RX ORDER — ONDANSETRON 2 MG/ML
4 INJECTION INTRAMUSCULAR; INTRAVENOUS EVERY 6 HOURS PRN
Status: DISCONTINUED | OUTPATIENT
Start: 2021-08-08 | End: 2021-08-10 | Stop reason: HOSPADM

## 2021-08-08 RX ORDER — PENICILLIN G POTASSIUM 5000000 [IU]/1
5 INJECTION, POWDER, FOR SOLUTION INTRAMUSCULAR; INTRAVENOUS ONCE
Status: COMPLETED | OUTPATIENT
Start: 2021-08-08 | End: 2021-08-08

## 2021-08-08 RX ORDER — ALUMINA, MAGNESIA, AND SIMETHICONE 2400; 2400; 240 MG/30ML; MG/30ML; MG/30ML
30 SUSPENSION ORAL EVERY 6 HOURS PRN
Status: DISCONTINUED | OUTPATIENT
Start: 2021-08-08 | End: 2021-08-08 | Stop reason: HOSPADM

## 2021-08-08 RX ORDER — ACETAMINOPHEN 500 MG
1000 TABLET ORAL EVERY 6 HOURS
Status: DISCONTINUED | OUTPATIENT
Start: 2021-08-08 | End: 2021-08-10 | Stop reason: HOSPADM

## 2021-08-08 RX ORDER — PENICILLIN G POTASSIUM 5000000 [IU]/1
INJECTION, POWDER, FOR SOLUTION INTRAMUSCULAR; INTRAVENOUS
Status: DISCONTINUED
Start: 2021-08-08 | End: 2021-08-09 | Stop reason: HOSPADM

## 2021-08-08 RX ORDER — SODIUM CHLORIDE, SODIUM LACTATE, POTASSIUM CHLORIDE, CALCIUM CHLORIDE 600; 310; 30; 20 MG/100ML; MG/100ML; MG/100ML; MG/100ML
INJECTION, SOLUTION INTRAVENOUS CONTINUOUS
Status: ACTIVE | OUTPATIENT
Start: 2021-08-08 | End: 2021-08-08

## 2021-08-08 RX ORDER — IBUPROFEN 800 MG/1
800 TABLET ORAL EVERY 8 HOURS PRN
Status: DISCONTINUED | OUTPATIENT
Start: 2021-08-08 | End: 2021-08-10 | Stop reason: HOSPADM

## 2021-08-08 RX ORDER — SODIUM CHLORIDE 9 MG/ML
INJECTION, SOLUTION INTRAVENOUS
Status: COMPLETED
Start: 2021-08-08 | End: 2021-08-08

## 2021-08-08 RX ORDER — BETAMETHASONE SODIUM PHOSPHATE AND BETAMETHASONE ACETATE 3; 3 MG/ML; MG/ML
12 INJECTION, SUSPENSION INTRA-ARTICULAR; INTRALESIONAL; INTRAMUSCULAR; SOFT TISSUE EVERY 24 HOURS
Status: DISCONTINUED | OUTPATIENT
Start: 2021-08-08 | End: 2021-08-09 | Stop reason: HOSPADM

## 2021-08-08 RX ORDER — ONDANSETRON 4 MG/1
4 TABLET, ORALLY DISINTEGRATING ORAL EVERY 6 HOURS PRN
Status: DISCONTINUED | OUTPATIENT
Start: 2021-08-08 | End: 2021-08-10 | Stop reason: HOSPADM

## 2021-08-08 RX ADMIN — PENICILLIN G POTASSIUM 5 MILLION UNITS: 5000000 POWDER, FOR SOLUTION INTRAMUSCULAR; INTRAPLEURAL; INTRATHECAL; INTRAVENOUS at 17:01

## 2021-08-08 RX ADMIN — SODIUM CHLORIDE 100 ML: 900 INJECTION INTRAVENOUS at 17:01

## 2021-08-08 RX ADMIN — SODIUM CHLORIDE, POTASSIUM CHLORIDE, SODIUM LACTATE AND CALCIUM CHLORIDE: 600; 310; 30; 20 INJECTION, SOLUTION INTRAVENOUS at 15:51

## 2021-08-08 RX ADMIN — SODIUM CHLORIDE 2.5 MILLION UNITS: 9 INJECTION, SOLUTION INTRAVENOUS at 21:00

## 2021-08-08 RX ADMIN — BETAMETHASONE SODIUM PHOSPHATE AND BETAMETHASONE ACETATE 12 MG: 3; 3 INJECTION, SUSPENSION INTRA-ARTICULAR; INTRALESIONAL; INTRAMUSCULAR at 15:36

## 2021-08-08 ASSESSMENT — COPD QUESTIONNAIRES
COPD SCREENING SCORE: 0
DO YOU EVER COUGH UP ANY MUCUS OR PHLEGM?: NO/ONLY WITH OCCASIONAL COLDS OR INFECTIONS
DURING THE PAST 4 WEEKS HOW MUCH DID YOU FEEL SHORT OF BREATH: NONE/LITTLE OF THE TIME
HAVE YOU SMOKED AT LEAST 100 CIGARETTES IN YOUR ENTIRE LIFE: NO/DON'T KNOW
IN THE PAST 12 MONTHS DO YOU DO LESS THAN YOU USED TO BECAUSE OF YOUR BREATHING PROBLEMS: DISAGREE/UNSURE

## 2021-08-08 ASSESSMENT — PATIENT HEALTH QUESTIONNAIRE - PHQ9
2. FEELING DOWN, DEPRESSED, IRRITABLE, OR HOPELESS: NOT AT ALL
1. LITTLE INTEREST OR PLEASURE IN DOING THINGS: NOT AT ALL
SUM OF ALL RESPONSES TO PHQ9 QUESTIONS 1 AND 2: 0

## 2021-08-08 ASSESSMENT — LIFESTYLE VARIABLES
TOTAL SCORE: 0
HAVE PEOPLE ANNOYED YOU BY CRITICIZING YOUR DRINKING: NO
CONSUMPTION TOTAL: NEGATIVE
EVER HAD A DRINK FIRST THING IN THE MORNING TO STEADY YOUR NERVES TO GET RID OF A HANGOVER: NO
AVERAGE NUMBER OF DAYS PER WEEK YOU HAVE A DRINK CONTAINING ALCOHOL: 0
EVER FELT BAD OR GUILTY ABOUT YOUR DRINKING: NO
DOES PATIENT WANT TO STOP DRINKING: NO
ALCOHOL_USE: NO
HAVE YOU EVER FELT YOU SHOULD CUT DOWN ON YOUR DRINKING: NO
ON A TYPICAL DAY WHEN YOU DRINK ALCOHOL HOW MANY DRINKS DO YOU HAVE: 0
HOW MANY TIMES IN THE PAST YEAR HAVE YOU HAD 5 OR MORE DRINKS IN A DAY: 0

## 2021-08-08 ASSESSMENT — PAIN SCALES - GENERAL: PAINLEVEL: 3

## 2021-08-08 ASSESSMENT — FIBROSIS 4 INDEX: FIB4 SCORE: 0.33

## 2021-08-08 NOTE — PROGRESS NOTES
Patient comes in with concerns that she has not felt baby move in 2 days.  She has had pelvic pressure and cramping last night. She also reports urinary urgency and leaking of urine.  Monitors applied, moderate variability noted with baseline of 155bpm. Decelerations with uterine irritability also noted.  This is discussed with Dr Boyd.  BPP ordered.  Result of 4/8 discussed with Dr Baker.  Patient to be admitted.  Report given to Vanesa SIMON.  Patient to have Amnisure, betamethasone and IV.  Patient transferred to antepartum room.

## 2021-08-08 NOTE — ED PROVIDER NOTES
"OB H&P:    CC: decreased fetal movement    HPI:  Ms. Frances Rosen is a 30 y.o.  @ 33w5d by LMP and US. The patient states that over the past 2 days she has not felt any fetal movement. She also notes occasional \"leakage of urine, especially when I laugh\" and urinary urgency. She denies any vaginal bleeding, LOF or contractions. She states she experienced 1 episode of vomiting this morning with nonbloody emesis but denies any headache, vision changes, fever, chills, changes to her vision, chest pain, shortness of breath, numbness, tingling or any other complaints. The patient notes a history of frequent UTIs in the remote past.     Contractions: No   Loss of fluid: No   Vaginal bleeding: No   Fetal movement: absent      PNC with [facility]    PNL:  Rh+/, RI, HIV neg, TrepAb neg, HBsAg NR, GC/CT neg/neg  Glucola: 128 mg/dL at 1 hour in 3rd trimester  GBS unknown      ROS:  Const: denies fevers, general concerns  CV/resp: reports no concerns  GI: denies abd pain, GI concerns  : see HPI  Neuro: denies HA/vision changes    OB History    Para Term  AB Living   2       1     SAB TAB Ectopic Molar Multiple Live Births   1                # Outcome Date GA Lbr Donnell/2nd Weight Sex Delivery Anes PTL Lv   2 Current            1 SAB 02/26/10              Obstetric Comments   Pregnancy unplanned but desired. FOB involved and supportive. Pt does work outside of the home at this time       GYN: denies STIs, no hx of cervical procedures    Patient reports no significant past medical history or past surgical history.       Current Outpatient Medications on File Prior to Encounter   Medication Sig Dispense Refill   • Prenatal Vit-Fe Fumarate-FA (PRENATAL PO) Take 1 Tab by mouth every day.         Patient reports no significant past family history      Social History     Tobacco Use   • Smoking status: Never Smoker   • Smokeless tobacco: Never Used   Vaping Use   • Vaping Use: Never used   Substance Use " "Topics   • Alcohol use: Not Currently   • Drug use: Yes     Comment: hx marijuana use last 2021         PE:  Vitals:    21 1204 21 1205   BP: 129/81 129/81   Pulse: 93 93   Resp:  16   Temp:  36.3 °C (97.3 °F)   TempSrc:  Temporal   SpO2:  97%   Weight:  75.8 kg (167 lb)   Height:  1.575 m (5' 2\")     gen: AAO, NAD  abd: soft, gravid, NT  Ext: NT, no edema    SVE: 2/80/-2 @ 1646  FHT: 145/moderate variability/+ accels/ occasional decels  Marycruz: occasional contractions seen on external tocometry    A/P: 30 y.o.  @ 33w5d by LMP with US complaining of decreased fetal movement.      -BPP    -4/8, FRANCIS 7.9 cm, mild left renal fetal pyelectasis   -Urinalysis with culture if indicated   -Monitor vitals   -CEFM and tocometry    -decelerations present   -Amnisure positive   -Admission to antepartum    Jalil oByd M.D.    I saw and examined the patient and discussed the management with the resident. I reviewed the resident's note and agree with the documented findings and plan of care.    Additional attending comments:  During our evaluation of the patient, found the BPP to be 4/8 so we put her on antepartum for further monitoring.  Her symptoms were obscure, not really complaining of anything specific, but does seem to be laboring.  Decided to do an amnisure as pt was adamant previously her leaking was only urine and not from her vagina.  Now that it is positive and the fetus has cat II FHT with 4/8 BPP, will plan for delivery.      Porsha Baker D.O.        "

## 2021-08-08 NOTE — H&P
"OB H&P:     CC: decreased fetal movement     HPI:  Ms. Frances Rosen is a 30 y.o.  @ 33w5d by LMP and US. The patient states that over the past 2 days she has not felt any fetal movement. She also notes occasional \"leakage of urine, especially when I laugh\" and urinary urgency. She denies any vaginal bleeding, LOF or contractions. She states she experienced 1 episode of vomiting this morning with nonbloody emesis but denies any headache, vision changes, fever, chills, changes to her vision, chest pain, shortness of breath, numbness, tingling or any other complaints. The patient notes a history of frequent UTIs in the remote past.      Contractions: No   Loss of fluid: No   Vaginal bleeding: No   Fetal movement: absent        PNC with [facility]     PNL:  Rh+/, RI, HIV neg, TrepAb neg, HBsAg NR, GC/CT neg/neg  Glucola: 128 mg/dL at 1 hour in 3rd trimester  GBS unknown        ROS:  Const: denies fevers, general concerns  CV/resp: reports no concerns  GI: denies abd pain, GI concerns  : see HPI  Neuro: denies HA/vision changes                       OB History    Para Term  AB Living   2       1     SAB TAB Ectopic Molar Multiple Live Births    1                 # Outcome Date GA Lbr Donnell/2nd Weight Sex Delivery Anes PTL Lv   2 Current                     1 SAB 02/26/10                       Obstetric Comments   Pregnancy unplanned but desired. FOB involved and supportive. Pt does work outside of the home at this time         GYN: denies STIs, no hx of cervical procedures     Patient reports no significant past medical history or past surgical history.                Current Outpatient Medications on File Prior to Encounter   Medication Sig Dispense Refill   • Prenatal Vit-Fe Fumarate-FA (PRENATAL PO) Take 1 Tab by mouth every day.             Patient reports no significant past family history       Social History            Tobacco Use   • Smoking status: Never Smoker   • Smokeless tobacco: " "Never Used   Vaping Use   • Vaping Use: Never used   Substance Use Topics   • Alcohol use: Not Currently   • Drug use: Yes       Comment: hx marijuana use last 2021            PE:  Vitals        Vitals:     21 1204 21 1205   BP: 129/81 129/81   Pulse: 93 93   Resp:   16   Temp:   36.3 °C (97.3 °F)   TempSrc:   Temporal   SpO2:   97%   Weight:   75.8 kg (167 lb)   Height:   1.575 m (5' 2\")         gen: AAO, NAD  abd: soft, gravid, NT  Ext: NT, no edema     SVE: 2/80/-2 @ 1646  FHT: 145/moderate variability/+ accels/ occasional decels  Marycruz: occasional contractions seen on external tocometry     A/P: 30 y.o.  @ 33w5d with PPROM,  contractions   - Concerning due to BPP 4/8 and decelerations noted on the monitor   - Plan to admit, start PCN and monitor FHT   - If baby tolerates contractions, will proceed with augmenting labor    - If any concerns about baby well being, will proceed with primary  section   - Likely PPROM occurred many days ago, will monitor for s/sx of infection    Porsha Baker D.O.    "

## 2021-08-08 NOTE — PROGRESS NOTES
1435) Report received, care assumed.  1520) Dr Baker at  discussing POC, SVE done and amnisure acquired  1530) IV started in LW  1615) Pt refusing to be tested for Covid, Charge nurse notified and is talking with pt.   1700) Dr Baker talked with pt re-covid testing and she agreed to do the test  Recurrent variables noted, pt in side lying position  1752) Pt feeling stronger contractions, Transferred to room 313 for labor  1900) Report to Alyson SIMON

## 2021-08-09 LAB
ERYTHROCYTE [DISTWIDTH] IN BLOOD BY AUTOMATED COUNT: 45.4 FL (ref 35.9–50)
ERYTHROCYTE [DISTWIDTH] IN BLOOD BY AUTOMATED COUNT: 46.2 FL (ref 35.9–50)
FLUAV RNA SPEC QL NAA+PROBE: NEGATIVE
FLUBV RNA SPEC QL NAA+PROBE: NEGATIVE
HCT VFR BLD AUTO: 38.9 % (ref 37–47)
HCT VFR BLD AUTO: 41.9 % (ref 37–47)
HGB BLD-MCNC: 13.3 G/DL (ref 12–16)
HGB BLD-MCNC: 14.2 G/DL (ref 12–16)
MCH RBC QN AUTO: 30 PG (ref 27–33)
MCH RBC QN AUTO: 30.2 PG (ref 27–33)
MCHC RBC AUTO-ENTMCNC: 33.9 G/DL (ref 33.6–35)
MCHC RBC AUTO-ENTMCNC: 34.2 G/DL (ref 33.6–35)
MCV RBC AUTO: 88.4 FL (ref 81.4–97.8)
MCV RBC AUTO: 88.6 FL (ref 81.4–97.8)
PATHOLOGY CONSULT NOTE: NORMAL
PLATELET # BLD AUTO: 272 K/UL (ref 164–446)
PLATELET # BLD AUTO: 284 K/UL (ref 164–446)
PMV BLD AUTO: 10 FL (ref 9–12.9)
PMV BLD AUTO: 10.3 FL (ref 9–12.9)
RBC # BLD AUTO: 4.4 M/UL (ref 4.2–5.4)
RBC # BLD AUTO: 4.73 M/UL (ref 4.2–5.4)
RSV RNA SPEC QL NAA+PROBE: NEGATIVE
SARS-COV-2 RNA RESP QL NAA+PROBE: NOTDETECTED
SPECIMEN SOURCE: NORMAL
WBC # BLD AUTO: 27.5 K/UL (ref 4.8–10.8)
WBC # BLD AUTO: 31.2 K/UL (ref 4.8–10.8)

## 2021-08-09 PROCEDURE — 85027 COMPLETE CBC AUTOMATED: CPT

## 2021-08-09 PROCEDURE — A9270 NON-COVERED ITEM OR SERVICE: HCPCS

## 2021-08-09 PROCEDURE — 700111 HCHG RX REV CODE 636 W/ 250 OVERRIDE (IP): Performed by: FAMILY MEDICINE

## 2021-08-09 PROCEDURE — A9270 NON-COVERED ITEM OR SERVICE: HCPCS | Performed by: ADVANCED PRACTICE MIDWIFE

## 2021-08-09 PROCEDURE — 304965 HCHG RECOVERY SERVICES

## 2021-08-09 PROCEDURE — 59409 OBSTETRICAL CARE: CPT

## 2021-08-09 PROCEDURE — 770002 HCHG ROOM/CARE - OB PRIVATE (112)

## 2021-08-09 PROCEDURE — 700105 HCHG RX REV CODE 258: Performed by: ADVANCED PRACTICE MIDWIFE

## 2021-08-09 PROCEDURE — 700111 HCHG RX REV CODE 636 W/ 250 OVERRIDE (IP)

## 2021-08-09 PROCEDURE — 36415 COLL VENOUS BLD VENIPUNCTURE: CPT

## 2021-08-09 PROCEDURE — 700102 HCHG RX REV CODE 250 W/ 637 OVERRIDE(OP)

## 2021-08-09 PROCEDURE — 700105 HCHG RX REV CODE 258: Performed by: FAMILY MEDICINE

## 2021-08-09 PROCEDURE — 59410 OBSTETRICAL CARE: CPT | Performed by: ADVANCED PRACTICE MIDWIFE

## 2021-08-09 PROCEDURE — 700102 HCHG RX REV CODE 250 W/ 637 OVERRIDE(OP): Performed by: ADVANCED PRACTICE MIDWIFE

## 2021-08-09 PROCEDURE — 700105 HCHG RX REV CODE 258

## 2021-08-09 RX ORDER — VITAMIN A ACETATE, BETA CAROTENE, ASCORBIC ACID, CHOLECALCIFEROL, .ALPHA.-TOCOPHEROL ACETATE, DL-, THIAMINE MONONITRATE, RIBOFLAVIN, NIACINAMIDE, PYRIDOXINE HYDROCHLORIDE, FOLIC ACID, CYANOCOBALAMIN, CALCIUM CARBONATE, FERROUS FUMARATE, ZINC OXIDE, CUPRIC OXIDE 3080; 12; 120; 400; 1; 1.84; 3; 20; 22; 920; 25; 200; 27; 10; 2 [IU]/1; UG/1; MG/1; [IU]/1; MG/1; MG/1; MG/1; MG/1; MG/1; [IU]/1; MG/1; MG/1; MG/1; MG/1; MG/1
1 TABLET, FILM COATED ORAL
Status: DISCONTINUED | OUTPATIENT
Start: 2021-08-09 | End: 2021-08-10 | Stop reason: HOSPADM

## 2021-08-09 RX ORDER — DOCUSATE SODIUM 100 MG/1
100 CAPSULE, LIQUID FILLED ORAL 2 TIMES DAILY PRN
Status: DISCONTINUED | OUTPATIENT
Start: 2021-08-09 | End: 2021-08-10 | Stop reason: HOSPADM

## 2021-08-09 RX ORDER — IBUPROFEN 600 MG/1
600 TABLET ORAL EVERY 6 HOURS PRN
Status: DISCONTINUED | OUTPATIENT
Start: 2021-08-09 | End: 2021-08-10 | Stop reason: HOSPADM

## 2021-08-09 RX ORDER — SODIUM CHLORIDE, SODIUM LACTATE, POTASSIUM CHLORIDE, CALCIUM CHLORIDE 600; 310; 30; 20 MG/100ML; MG/100ML; MG/100ML; MG/100ML
INJECTION, SOLUTION INTRAVENOUS PRN
Status: DISCONTINUED | OUTPATIENT
Start: 2021-08-09 | End: 2021-08-10 | Stop reason: HOSPADM

## 2021-08-09 RX ADMIN — PRENATAL WITH FERROUS FUM AND FOLIC ACID 1 TABLET: 3080; 920; 120; 400; 22; 1.84; 3; 20; 10; 1; 12; 200; 27; 25; 2 TABLET ORAL at 08:45

## 2021-08-09 RX ADMIN — OXYTOCIN 2000 ML/HR: 10 INJECTION, SOLUTION INTRAMUSCULAR; INTRAVENOUS at 00:53

## 2021-08-09 RX ADMIN — GENTAMICIN SULFATE 250 MG: 40 INJECTION, SOLUTION INTRAMUSCULAR; INTRAVENOUS at 14:12

## 2021-08-09 RX ADMIN — ACETAMINOPHEN 1000 MG: 500 TABLET ORAL at 22:42

## 2021-08-09 RX ADMIN — SODIUM CHLORIDE, POTASSIUM CHLORIDE, SODIUM LACTATE AND CALCIUM CHLORIDE: 600; 310; 30; 20 INJECTION, SOLUTION INTRAVENOUS at 14:13

## 2021-08-09 RX ADMIN — AMPICILLIN SODIUM 1 G: 1 INJECTION, POWDER, FOR SOLUTION INTRAMUSCULAR; INTRAVENOUS at 16:01

## 2021-08-09 RX ADMIN — AMPICILLIN SODIUM 1 G: 1 INJECTION, POWDER, FOR SOLUTION INTRAMUSCULAR; INTRAVENOUS at 22:37

## 2021-08-09 ASSESSMENT — PAIN DESCRIPTION - PAIN TYPE
TYPE: ACUTE PAIN
TYPE: ACUTE PAIN

## 2021-08-09 NOTE — PROGRESS NOTES
Obstetrics & Gynecology Post-Delivery Progress Note    Date of Service      30 y.o.  s/p vaginal, spontaneous  Delivery date: 2021    Events  infant in NICU    Subjective  Pain: No  Bleeding: lochia minimal  PO's: taking regular diet  Voiding: without difficulty  Ambulating: yes  Passing flatus: No  Feeding: breastpumping     Objective  Temp:  [36.3 °C (97.3 °F)-37.6 °C (99.7 °F)] 36.3 °C (97.4 °F)  Pulse:  [] 97  Resp:  [16-20] 18  BP: (129-137)/(81-88) 134/81  SpO2:  [97 %] 97 %    Physical Exam  General: well  Chest/Breasts: nipples intact   Abdomen: soft, non-distended  Fundus: firm and at umbilicus  Incision: not applicable, (vaginal delivery)  Perineum: well approximated  Extremities: symmetric, calves nontender    Recent Labs     21  1530   WBC 16.6*   RBC 4.76   HEMOGLOBIN 14.3   HEMATOCRIT 41.7   MCV 87.6   MCH 30.0   RDW 44.1   PLATELETCT 269   MPV 10.4   NEUTSPOLYS 82.30*   LYMPHOCYTES 10.10*   MONOCYTES 6.90   EOSINOPHILS 0.10   BASOPHILS 0.20       Assessment/Plan  Frances Rosen is a 30 y.o.  s/p postpartum day #1  s/p vaginal, spontaneous    1. Post care: meeting all goals  2. Hemodynamics: awaiting CBC  3. Pain: controlled  4. Rh+, Rubella Immune  5. Method of Feeding: plans to breastfeed  6. Method of Contraception: NA  7. Disposition: likely home postpartum day 2    VTE prophylaxis: none indicated

## 2021-08-09 NOTE — CARE PLAN
The patient is Stable - Low risk of patient condition declining or worsening         Problem: Altered Physiologic Condition  Goal: Patient physiologically stable as evidenced by normal lochia, palpable uterine involution and vitals within normal limits  Outcome: Progressing  Note: Patient VS stable and within defined limits. Fundus firm at U, lochia light rubra at time of assessment.      Problem: Infection - Postpartum  Goal: Postpartum patient will be free of signs and symptoms of infection  Outcome: Progressing  Note: Patient is showing no signs or symptoms of infection at time of assessment.

## 2021-08-09 NOTE — PROGRESS NOTES
ROSE from Lab called with critical result of WBC at 31.2. Critical lab result read back to ROSE.   Dr. Mata notified of critical lab result at 1140. Telephone order received as the followin) Keep patient on Q4H VS  2) Repeat CBC at 1800 this evening.

## 2021-08-09 NOTE — CARE PLAN
The patient is Stable - Low risk of patient condition declining or worsening    Shift Goals  Clinical Goals: lochia will be light and VSS    Progress made toward(s) clinical / shift goals:  Lochia has been light and VSS    Patient is not progressing towards the following goals: N/A

## 2021-08-09 NOTE — L&D DELIVERY NOTE
Admit Date:   08/08/2021      Pregnancy Complications: PPROM x 48 hours  Medical Induction of Labor: no  GBS: unknown  Anesthesia: none  Gestational Age at delivery: 33w6d    Patient  progressed well with no interventions after she was found to be ruptured. She did received betamethasone and penicillin. She ambulated to bathroom and after return was noted to have repetitive variable and early decels. She was checked and found to +4. Room quickly set up for delivery and NICU staff called to be in attendance. She pushed well spontaneously to deliver viable male infant in GUIDO position at 2321. Short cord was noted and cord was clamped and cut at perineum. Infant given to awaiting NICU staff. Cord gases collected.     Pitocin infused via IV bolus. Signs of placenta separation noted and gentle cord traction was completed. Intact placenta was delivered spontaneously at 2326 where 3VC was noted. EBL 100ml. Fundus firm with minimal massage. Inspection of vulva and vagina was completed and left labial abrasion was noted that was hemostatic and did not require repair. Routine postpartum care was initiated. Infant to NICU and weight is pending.       Cele SHEA CNM/ Dr. Baker, Attending

## 2021-08-09 NOTE — PROGRESS NOTES
1900-rcvd report from cande RN and assumed care of pt.  Dr. Baker at bedside. SVE=3/80/-2 FSE and IUPC placed.  2255-Pt really getting uncomfortable with contractions. Will check pt.  2308-pt feeling pressure. SVE=complete +4, Room set up for delivery.  2320- of viable baby boy. NICU team here.

## 2021-08-09 NOTE — PROGRESS NOTES
Assumed patient care. Took report from Trey L&BRANNON RN. Assessed patient, VS stable and within defined parameters, fundus firm at U, lochia light rubra. No pain/redness/swelling in calves.  Patient reports pain as 2/10 on pain scale. Oriented patient to the post partum unit including room features, scheduled medications, welcome letter, and the post partum depression scale. Educated patient on room safety and infant safety. Patient understands and verbalizes safe infant sleeping practices. Current COVID-19 mask policy gone over. Patient and support person acknowledge policy. Current visitor policy discussed. Patient and support person acknowledge policy. Call light within reach. Will continue to monitor patient's vitals. Baby in NICU.    Pumping initiated. Pump parts and settings gone over. Patient acknowledges use.

## 2021-08-09 NOTE — PROGRESS NOTES
Bedside report received from Rubi DURÁN RN. Patient care assumed. Chart, prenatal labs, and orders reviewed. Pt assessment complete, wnl. Fundus firm with minimal discharge. Pt ambulating to bathroom and voiding without difficulty. Patient denies any dizziness or lightheadedness, any calf pain/tenderness, chest pain or SOB, respiratory symptoms, or any recent ill contacts. Kindly reminded patient and support person to wear face masks when staff is present in room. Plan of care discussed with patient for the day including pumping every 3 hours, pain management, and ambulation in halls. Pain medication plan discussed with patient; patient states she will call if PRN pain medication is wanted. All questions/concerns addressed at this time. Call light within reach, encouraged to call with needs. Will continue with routine postpartum cares.

## 2021-08-09 NOTE — LACTATION NOTE
This note was copied from a baby's chart.  Using Ameda platinum pump every 3 hours and bringing collected colstrum to NICU.  Pump setting reviewed and using correctly.  Flanges changed out to 22 for better fitment and mom states that it feels better. Requests help with getting signed up with WIC and will have WIC rep see tomorrow.  Milk storage info given and hand expression taught.  Encouraged to follow pump use with 5-10 minutes of hand expression to help increase milk supply

## 2021-08-10 VITALS
TEMPERATURE: 97.6 F | OXYGEN SATURATION: 97 % | DIASTOLIC BLOOD PRESSURE: 78 MMHG | SYSTOLIC BLOOD PRESSURE: 115 MMHG | RESPIRATION RATE: 18 BRPM | BODY MASS INDEX: 30.73 KG/M2 | WEIGHT: 167 LBS | HEIGHT: 62 IN | HEART RATE: 69 BPM

## 2021-08-10 PROCEDURE — A9270 NON-COVERED ITEM OR SERVICE: HCPCS

## 2021-08-10 PROCEDURE — 700102 HCHG RX REV CODE 250 W/ 637 OVERRIDE(OP): Performed by: ADVANCED PRACTICE MIDWIFE

## 2021-08-10 PROCEDURE — 700105 HCHG RX REV CODE 258: Performed by: FAMILY MEDICINE

## 2021-08-10 PROCEDURE — 700102 HCHG RX REV CODE 250 W/ 637 OVERRIDE(OP)

## 2021-08-10 PROCEDURE — A9270 NON-COVERED ITEM OR SERVICE: HCPCS | Performed by: ADVANCED PRACTICE MIDWIFE

## 2021-08-10 PROCEDURE — 700111 HCHG RX REV CODE 636 W/ 250 OVERRIDE (IP): Performed by: FAMILY MEDICINE

## 2021-08-10 RX ORDER — ACETAMINOPHEN 500 MG
1000 TABLET ORAL EVERY 6 HOURS PRN
Qty: 30 TABLET | Refills: 0 | Status: SHIPPED | OUTPATIENT
Start: 2021-08-10 | End: 2021-08-10

## 2021-08-10 RX ORDER — PSEUDOEPHEDRINE HCL 30 MG
100 TABLET ORAL 2 TIMES DAILY PRN
Qty: 60 CAPSULE | Refills: 0 | Status: SHIPPED | OUTPATIENT
Start: 2021-08-10 | End: 2021-08-10

## 2021-08-10 RX ORDER — PSEUDOEPHEDRINE HCL 30 MG
100 TABLET ORAL 2 TIMES DAILY PRN
Qty: 60 CAPSULE | Refills: 0 | Status: SHIPPED | OUTPATIENT
Start: 2021-08-10 | End: 2021-08-21

## 2021-08-10 RX ORDER — IBUPROFEN 800 MG/1
800 TABLET ORAL EVERY 8 HOURS PRN
Qty: 30 TABLET | Refills: 0 | Status: SHIPPED | OUTPATIENT
Start: 2021-08-10 | End: 2021-08-21

## 2021-08-10 RX ORDER — IBUPROFEN 800 MG/1
800 TABLET ORAL EVERY 8 HOURS PRN
Qty: 30 TABLET | Refills: 0 | Status: SHIPPED | OUTPATIENT
Start: 2021-08-10 | End: 2021-08-10

## 2021-08-10 RX ORDER — ACETAMINOPHEN 500 MG
1000 TABLET ORAL EVERY 6 HOURS PRN
Qty: 30 TABLET | Refills: 0 | Status: SHIPPED | OUTPATIENT
Start: 2021-08-10 | End: 2021-08-21

## 2021-08-10 RX ADMIN — PRENATAL WITH FERROUS FUM AND FOLIC ACID 1 TABLET: 3080; 920; 120; 400; 22; 1.84; 3; 20; 10; 1; 12; 200; 27; 25; 2 TABLET ORAL at 09:56

## 2021-08-10 RX ADMIN — IBUPROFEN 600 MG: 600 TABLET, FILM COATED ORAL at 12:30

## 2021-08-10 RX ADMIN — GENTAMICIN SULFATE 250 MG: 40 INJECTION, SOLUTION INTRAMUSCULAR; INTRAVENOUS at 12:08

## 2021-08-10 RX ADMIN — ACETAMINOPHEN 1000 MG: 500 TABLET ORAL at 09:56

## 2021-08-10 RX ADMIN — AMPICILLIN SODIUM 1 G: 1 INJECTION, POWDER, FOR SOLUTION INTRAMUSCULAR; INTRAVENOUS at 04:24

## 2021-08-10 RX ADMIN — AMPICILLIN SODIUM 1 G: 1 INJECTION, POWDER, FOR SOLUTION INTRAMUSCULAR; INTRAVENOUS at 09:56

## 2021-08-10 ASSESSMENT — PAIN DESCRIPTION - PAIN TYPE: TYPE: ACUTE PAIN

## 2021-08-10 NOTE — DISCHARGE INSTRUCTIONS
PATIENT DISCHARGE EDUCATION INSTRUCTION SHEET  REASONS TO CALL YOUR OBSTETRICIAN  · Persistent fever, shaking, chills (Temperature higher than 100.4) may indicate you have an infection  · Heavy bleeding: soaking more than 1 pad per hour; Passing clots an egg-sized clot or bigger may mean you have an postpartum hemorrhage  · Foul odor from vagina or bad smelling or discolored discharge or blood  · Breast infection (Mastitis symptoms); breast pain, chills, fever, redness or red streaks, may feel flu like symptoms  · Urinary pain, burning or frequency  · Incision that is not healing, increased redness, swelling, tenderness or pain, or any pus from episiotomy or  site may mean you have an infection  · Redness, swelling, warmth, or painful to touch in the calf area of your leg may mean you have a blood clot  · Severe or intensified depression, thoughts or feelings of wanting to hurt yourself or someone else   · Pain in chest, obstructed breathing or shortness of breath (trouble catching your breath) may mean you are having a postpartum complication. Call your provider immediately   · Headache that does not get better, even after taking medicine, a bad headache with vision changes or pain in the upper right area of your belly may mean you have high blood pressure or post birth preeclampsia. Call your provider immediately    HAND WASHING  All family and friends should wash their hands:  · Before and after holding the baby  · Before feeding the baby  · After using the restroom or changing the baby's diaper    WOUND CARE  Ask your physician for additional care instructions. In general:  ·  Incision:  · May shower and pat incision dry   · Keep the incision clean and dry  · There should not be any opening or pus from the incision  · Continue to walk at home 3 times a day   · Do NOT lift anything heavier than your baby (over 10 pounds)  · Encourage family to help participate in care of the  to allow  rest and mom time to heal  · Episiotomy/Laceration  · May use lety-spray bottle, witch hazel pads and dermaplast spray for comfort  · Use lety-spray bottle after urinating to cleanse perineal area  · To prevent burning during urination spray lety-water bottle on labial area   · Pat perineal area dry until episiotomy/laceration is healed  · Continue to use lety-bottle until bleeding stops as needed  · If have a 2nd degree laceration or greater, a Sitz bath can offer relief from soreness, burning, and inflammation   · Sitz Bath   · Sit in 6 inches of warm water and soak laceration as needed until the laceration heals    VAGINAL CARE AND BLEEDING  · Nothing inside vagina for 6 weeks:   · No sexual intercourse, tampons or douching  · Bleeding may continue for 2-4 weeks. Amount and color may vary  · Soaking 1 pad or more in an hour for several hours is considered heavy bleeding  · Passing large egg sized blood clots can be concerning  · If you feel like you have heavy bleeding or are having increasing amount of blood clots call your Obstetrician immediately  · If you begin feeling faint upon standing, feeling sick to your stomach, have clammy skin, a really fast heartbeat, have chills, start feeling confused, dizzy, sleepy or weak, or feeling like you're going to faint call your Obstetrician immediately    HYPERTENSION   Preeclampsia or gestational hypertension are types of high blood pressure that only pregnant women can get. It is important for you to be aware of symptoms to seek early intervention and treatment. If you have any of these symptoms immediately call your Obstetrician    · Vision changes or blurred vision   · Severe headache or pain that is unrelieved with medication and will not go away  · Persistent pain in upper abdomen or shoulder   · Increased swelling of face, feet, or hands  · Difficulty breathing or shortness of breath at rest  · Urinating less than usual    URINATION AND BOWEL MOVEMENTS  · Eating  "more fiber (bran cereal, fruits, and vegetables) and drinking plenty of fluids will help to avoid constipation  · Urinary frequency and urgency after childbirth is normal  · If you experience any urinary pain, burning or frequency call your provider    BIRTH CONTROL  · It is possible to become pregnant at any time after delivery and while breastfeeding  · Plan to discuss a method of birth control with your physician at your post delivery follow up visit    POSTPARTUM BLUES  During the first few days after birth, you may experience a sense of the \"blues\" which may include impatience, irritability or even crying. These feelings come and go quickly. However, as many as 1 in 10 women experience emotional symptoms known as postpartum depression.     POSTPARTUM DEPRESSION    May start as early as the second or third day after delivery or take several weeks or months to develop. Symptoms of \"blues\" are present, but are more intense: Crying spells; loss of appetite; feelings of hopelessness or loss of control; fear of touching the baby; over concern or no concern at all about the baby; little or no concern about your own appearance/caring for yourself; and/or inability to sleep or excessive sleeping. Contact your Obstetrician if you are experiencing any of these symptoms     PREVENTING SHAKEN BABY  If you are angry or stressed, PUT THE BABY IN THE CRIB, step away, take some deep breaths, and wait until you are calm to care for the baby. DO NOT SHAKE THE BABY. You are not alone, call a supporter for help.  · [facility] 24/7 crisis call line (427-879-8570) or (1-561.998.2710)  · You can also text them, text \"ANSWER\" (982861)      "

## 2021-08-10 NOTE — CARE PLAN
The patient is Stable - Low risk of patient condition declining or worsening    Shift Goals  Clinical Goals: VS stable, lochia WDL    Progress made toward(s) clinical / shift goals:  Lochia WDL light rubra. And VS stable thus far in shift. Will continue to monitor.    Patient is not progressing towards the following goals:

## 2021-08-10 NOTE — LACTATION NOTE
This note was copied from a baby's chart.  Follow-up visit, baby 33.5 weeks in NICU. MOB has been pumping & hand expressing regularly, 8-10 times in 24 hours- pump schedule given. MOB has been taking colostrum to NICU, [facility], given with review. Mother just signed-up with [facility] and plans to pick-up loaner pump for home today. Mother comfortable going home & pumping/storing EBM.

## 2021-08-10 NOTE — NON-PROVIDER
Obestetrics and Gynecology Post-Partum Progress Note    Date of Service: 8/10/21  PostPartum day #2:    Subjective:   Pt reports feeling well, pain is tolerable with pain medication.  Pt is ambulating without difficulty, has voided spontaneously, is passing flatus.  Is tolerating food.  Reports her bleeding is relatively minimal.    Breast feeding.: yes, reports this is going well.    24hr VS:  Temp:  [36.3 °C (97.3 °F)-36.8 °C (98.2 °F)] 36.4 °C (97.6 °F)  Pulse:  [69-97] 69  Resp:  [17-18] 18  BP: (115-136)/(69-83) 115/78  SpO2:  [95 %-97 %] 97 %    @IO@  Gen: AAO, NAD  CV: RRR  Resp: clear to auscultation bilaterally  Abd: uterus is firm, with mild tenderness to palpation  Ext: NT, mild edema bilaterally    Meds:   Current Facility-Administered Medications:   •  LR infusion, , Intravenous, PRN, LION Mccann, Last Rate: 125 mL/hr at 08/09/21 1413, New Bag at 08/09/21 1413  •  tetanus-dipth-acell pertussis (Tdap) inj 0.5 mL, 0.5 mL, Intramuscular, Once PRN, LION Mccann  •  docusate sodium (COLACE) capsule 100 mg, 100 mg, Oral, BID PRN, LION Mccann  •  prenatal plus vitamin (STUARTNATAL 1+1) 27-1 MG tablet 1 tablet, 1 tablet, Oral, Daily-0800, LION Mccann, 1 tablet at 08/09/21 0845  •  ibuprofen (MOTRIN) tablet 600 mg, 600 mg, Oral, Q6HRS PRN, LION Mccann  •  gentamicin (GARAMYCIN) 250 mg in  mL IVPB, 5 mg/kg (Ideal), Intravenous, Q24HR, Ruperto Mata M.D., Stopped at 08/09/21 1442  •  ampicillin (OMNIPEN) 1 g in  mL IVPB, 1,000 mg, Intravenous, Q6HRS, Ruperto Mata M.D., Stopped at 08/10/21 0454  •  ondansetron (ZOFRAN ODT) dispertab 4 mg, 4 mg, Oral, Q6HRS PRN **OR** ondansetron (ZOFRAN) syringe/vial injection 4 mg, 4 mg, Intravenous, Q6HRS PRN, Jalil Boyd M.D.  •  [COMPLETED] oxytocin (PITOCIN) infusion (for postpartum), 2,000 mL/hr, Intravenous, Once, Last Rate: 2,000 mL/hr at 08/09/21 0053, 2,000 mL/hr at 08/09/21 0053 **FOLLOWED  BY** oxytocin (PITOCIN) infusion (for postpartum),  mL/hr, Intravenous, Continuous, Jalil Boyd M.D.  •  ibuprofen (MOTRIN) tablet 800 mg, 800 mg, Oral, Q8HRS PRN, Jalil Boyd M.D.  •  acetaminophen (TYLENOL) tablet 1,000 mg, 1,000 mg, Oral, Q6HR, Jalil Boyd M.D., 1,000 mg at 08/09/21 2242    Lab:   Recent Results (from the past 48 hour(s))   POCT urinalysis device results    Collection Time: 08/08/21 12:02 PM   Result Value Ref Range    POC Color Yellow     POC Appearance Clear     POC Glucose Negative Negative mg/dL    POC Ketones Negative Negative mg/dL    POC Specific Gravity 1.010 1.005 - 1.030    POC Blood Trace-intact (A) Negative    POC Urine PH 6.5 5.0 - 8.0    POC Protein Negative Negative mg/dL    POC Nitrites Negative Negative    POC Leukocyte Esterase Trace (A) Negative   URINALYSIS    Collection Time: 08/08/21  1:19 PM    Specimen: Urine, Clean Catch   Result Value Ref Range    Color Yellow     Character Clear     Specific Gravity 1.009 <1.035    Ph 6.5 5.0 - 8.0    Glucose Negative Negative mg/dL    Ketones Negative Negative mg/dL    Protein Negative Negative mg/dL    Bilirubin Negative Negative    Urobilinogen, Urine 0.2 Negative    Nitrite Negative Negative    Leukocyte Esterase Trace (A) Negative    Occult Blood Negative Negative    Micro Urine Req Microscopic    URINE MICROSCOPIC (W/UA)    Collection Time: 08/08/21  1:19 PM   Result Value Ref Range    WBC 0-2 /hpf    RBC 0-2 /hpf    Bacteria Negative None /hpf    Epithelial Cells Negative /hpf    Hyaline Cast 0-2 /lpf   AMNISURE ROM ASSAY    Collection Time: 08/08/21  3:20 PM   Result Value Ref Range    AmniSure ROM Positive (AA) Negative   Hold Blood Bank Specimen (Not Tested)    Collection Time: 08/08/21  3:30 PM   Result Value Ref Range    Holding Tube - Bb DONE    CBC WITH DIFFERENTIAL    Collection Time: 08/08/21  3:30 PM   Result Value Ref Range    WBC 16.6 (H) 4.8 - 10.8 K/uL    RBC 4.76 4.20 - 5.40 M/uL    Hemoglobin  14.3 12.0 - 16.0 g/dL    Hematocrit 41.7 37.0 - 47.0 %    MCV 87.6 81.4 - 97.8 fL    MCH 30.0 27.0 - 33.0 pg    MCHC 34.3 33.6 - 35.0 g/dL    RDW 44.1 35.9 - 50.0 fL    Platelet Count 269 164 - 446 K/uL    MPV 10.4 9.0 - 12.9 fL    Neutrophils-Polys 82.30 (H) 44.00 - 72.00 %    Lymphocytes 10.10 (L) 22.00 - 41.00 %    Monocytes 6.90 0.00 - 13.40 %    Eosinophils 0.10 0.00 - 6.90 %    Basophils 0.20 0.00 - 1.80 %    Immature Granulocytes 0.40 0.00 - 0.90 %    Nucleated RBC 0.00 /100 WBC    Neutrophils (Absolute) 13.63 (H) 2.00 - 7.15 K/uL    Lymphs (Absolute) 1.68 1.00 - 4.80 K/uL    Monos (Absolute) 1.15 (H) 0.00 - 0.85 K/uL    Eos (Absolute) 0.02 0.00 - 0.51 K/uL    Baso (Absolute) 0.03 0.00 - 0.12 K/uL    Immature Granulocytes (abs) 0.06 0.00 - 0.11 K/uL    NRBC (Absolute) 0.00 K/uL   CoV-2, Flu A/B, And RSV by PCR    Collection Time: 08/08/21  5:00 PM   Result Value Ref Range    Influenza virus A RNA Negative Negative    Influenza virus B, PCR Negative Negative    RSV, PCR Negative Negative    SARS-CoV-2 by PCR NotDetected     SARS-CoV-2 Source NP Swab    Histology Request    Collection Time: 08/09/21  8:35 AM   Result Value Ref Range    Pathology Request Sent to Histo    CBC without differential    Collection Time: 08/09/21  8:59 AM   Result Value Ref Range    WBC 31.2 (HH) 4.8 - 10.8 K/uL    RBC 4.73 4.20 - 5.40 M/uL    Hemoglobin 14.2 12.0 - 16.0 g/dL    Hematocrit 41.9 37.0 - 47.0 %    MCV 88.6 81.4 - 97.8 fL    MCH 30.0 27.0 - 33.0 pg    MCHC 33.9 33.6 - 35.0 g/dL    RDW 45.4 35.9 - 50.0 fL    Platelet Count 284 164 - 446 K/uL    MPV 10.3 9.0 - 12.9 fL   CBC WITHOUT DIFFERENTIAL    Collection Time: 08/09/21  5:31 PM   Result Value Ref Range    WBC 27.5 (H) 4.8 - 10.8 K/uL    RBC 4.40 4.20 - 5.40 M/uL    Hemoglobin 13.3 12.0 - 16.0 g/dL    Hematocrit 38.9 37.0 - 47.0 %    MCV 88.4 81.4 - 97.8 fL    MCH 30.2 27.0 - 33.0 pg    MCHC 34.2 33.6 - 35.0 g/dL    RDW 46.2 35.9 - 50.0 fL    Platelet Count 272 164 - 446  K/uL    MPV 10.0 9.0 - 12.9 fL       A/P: 30 y.o.  POD 2 s/p vaginal delivery @ 33w5d for PPROM.  - Doing well postpartum, meeting all postop milestones; encouraged ambulation, cont routine postop care  - infant: in NICU  - Rh+  - contraception: no plans  -Before discharge,  patient on postpartum blues/depression. Follow up for wound check in one week. -Follow up appointment in 4-5 weeks.   - ppx: SCDs    dispo: anticipate discharge POD 2 vs 3      Rahul Morris

## 2021-08-10 NOTE — PROGRESS NOTES
Assessment done. Pt.medicated with scheduled tylenol.Fundus firm.Lochia light. Fob at bedside,supportive.patient ambulating to nicu. Fob here and supportive.

## 2021-08-10 NOTE — DISCHARGE PLANNING
Discharge Planning Assessment Post Partum     Reason for Referral: NICU  Address: [facility] [place] [address]  Type of Living Situation: House with FOB   Mom Diagnosis: Pregnancy   Baby Diagnosis: NICU  Primary Language: English      Name of Baby: Salvador Hernandez   Mother of the Baby: Frances Rosen ([phone])  Father of the Baby: Salvador Hernandez  Involved in baby’s care? Yes  Contact Information: [phone]     Prenatal Care: Yes, with [facility]  Mom's PCP: None  PCP for new baby: Pediatrician List Provided     Support System: Yes  Coping/Bonding between mother & baby: Yes  Source of Feeding: Breast Pumping   Supplies for Infant: Semi-Prepared, will be fully prepared at time of baby's discharge.     Mom's Insurance: [facility] Medicaid   Baby Covered on Insurance: MOB's insurance   Mother Employed/School: No. FOB is employed  Other children in the home/names & ages: No, MOB's 1st child.  FOB has a 19 and 16 year old that lives with their mother in [facility]     Financial Hardship/Income: Denies  Mom's Mental status: Alert and Oriented x 4  Services used prior to admit: Medicaid, WIC and food stamps.      CPS History: Denies  Psychiatric History: Denies. MOB declined Postpartum resources at this time and reported she would contact this LSW if she needed them in the future. LSW explained the difference between PPD and baby blues and encouraged MOB to reach out if she is experiencing any heightened anxiety or depression.    Domestic Violence History: Denies.  Drug/ETOH History: Denies          Resources Provided: Pediatrician List.  MOB declined Postpartum and reported she would contact this LSW if she needed them in the future.  Referrals Made: None      Clearance for Discharge: Baby is clear to discahrge home with MOB/FOB upon medical clearance.       Ongoing Plan: Continue to provide support and resources to family until discharge.

## 2021-08-10 NOTE — PROGRESS NOTES
1900- Received report from AURORA Valdez. Assumed care of pt. Significant other at bedside.    1955- Assessment complete. Fundus firm and palpable, lochia light rubra. Pain management and interventions discussed with pt. Pt. Will notify this RN if PRN pain medication is needed and declines pain at this time. Baby in NICU. Patient using electric breast pump. All questions and concerns discussed at this time. Encouraged pt. to call with needs. Will continue to monitor.     2000- Patient and FOB down to NICU. Patient declines wheelchair and would like to walk at this time.

## 2021-08-10 NOTE — PROGRESS NOTES
Patient called rn to room because she had bumped arm and IV was coming out during gentamycin administration. IV catheter noted to be mostly out with tape coming off. Small bleb noted at inserting site from fluid infiltration. Medication stopped and IV removed. Call later placed to Dr Boyd regarding need for continued medication. Per text IV doesn't need to be restarted for antibiotics as long as patient is afebrile and without pain. Patient denies fever or chills. Patient denies pain.  1445- discharge instructions reviewed with patient and signed. Prescriptions given.  1455 patient discharged to home.

## 2021-08-10 NOTE — DISCHARGE SUMMARY
Discharge Summary:     Date of Admission: 2021  Date of Discharge: 08/10/21      Admitting diagnosis:    1. Pregnancy @ 33w5d      Discharge Diagnosis:   1. Status post vaginal, spontaneous.    History reviewed. No pertinent past medical history.  OB History    Para Term  AB Living   2 1   1 1 1   SAB TAB Ectopic Molar Multiple Live Births   1       0 1      # Outcome Date GA Lbr Donnell/2nd Weight Sex Delivery Anes PTL Lv   2  21 33w5d  2.05 kg (4 lb 8.3 oz) M Vag-Spont None Y JOANNA   1 SAB 02/26/10              Obstetric Comments   Pregnancy unplanned but desired. FOB involved and supportive. Pt does work outside of the home at this time     History reviewed. No pertinent surgical history.  Patient has no known allergies.    Patient Active Problem List   Diagnosis   • Supervision of other normal pregnancy, antepartum   • Marijuana use - quit when she found out about pregnancy       Hospital Course:   30 y.o. now , was admitted for decreased fetal movement, underwent Active Labor, vaginal, spontaneous. Pt gave birth to baby boy with APGARs of 7/8 and weight 2050g.  Patient's postpartum course was unremarkable, with progressive advancement in diet , ambulation and toleration of oral analgesia. Patient without complaints today and desires discharge.  For postpartum contraception, pt desires will discuss at follow up appointment.    Physical Exam:  Temp:  [36.3 °C (97.3 °F)-36.8 °C (98.2 °F)] 36.4 °C (97.6 °F)  Pulse:  [69-97] 69  Resp:  [17-18] 18  BP: (115-136)/(69-83) 115/78  SpO2:  [95 %-97 %] 97 %  Physical Exam  General: well  Chest/Breasts: breastfeeding well   Abdomen: nontender, soft, non-distended  Fundus: firm and at umbilicus  Incision: not applicable, (vaginal delivery)  Perineum: deferred  Extremities: symmetric and no edema, calves nontender    Current Facility-Administered Medications   Medication Dose   • LR infusion     • tetanus-dipth-acell pertussis (Tdap) inj 0.5  mL  0.5 mL   • docusate sodium (COLACE) capsule 100 mg  100 mg   • prenatal plus vitamin (STUARTNATAL 1+1) 27-1 MG tablet 1 tablet  1 tablet   • ibuprofen (MOTRIN) tablet 600 mg  600 mg   • gentamicin (GARAMYCIN) 250 mg in  mL IVPB  5 mg/kg (Ideal)   • ampicillin (OMNIPEN) 1 g in  mL IVPB  1,000 mg   • ondansetron (ZOFRAN ODT) dispertab 4 mg  4 mg    Or   • ondansetron (ZOFRAN) syringe/vial injection 4 mg  4 mg   • oxytocin (PITOCIN) infusion (for postpartum)   mL/hr   • ibuprofen (MOTRIN) tablet 800 mg  800 mg   • acetaminophen (TYLENOL) tablet 1,000 mg  1,000 mg       Recent Labs     21  1530 21  0859 21  1731   WBC 16.6* 31.2* 27.5*   RBC 4.76 4.73 4.40   HEMOGLOBIN 14.3 14.2 13.3   HEMATOCRIT 41.7 41.9 38.9   MCV 87.6 88.6 88.4   MCH 30.0 30.0 30.2   MCHC 34.3 33.9 34.2   RDW 44.1 45.4 46.2   PLATELETCT 269 284 272   MPV 10.4 10.3 10.0         Activity/ Discharge Instructions::   Discharge to home  Pelvic Rest x 6 weeks  No heavy lifting x4 weeks  Call or come to ED for: heavy vaginal bleeding, fever >100.4, severe abdominal pain, severe headache, chest pain, shortness of breath,  N/V, incisional drainage, or other concerns.       Follow up:  [facility] in 5 weeks for vaginal delivery; 1 week for incision check for  delivery.     Discharge Meds:   Current Outpatient Medications   Medication Sig Dispense Refill   • acetaminophen (TYLENOL) 500 MG Tab Take 2 Tablets by mouth every 6 hours as needed. 30 tablet 0   • docusate sodium 100 MG Cap Take 100 mg by mouth 2 times a day as needed for Constipation. 60 capsule 0   • ibuprofen (MOTRIN) 800 MG Tab Take 1 tablet by mouth every 8 hours as needed (For cramping after delivery; do not give if patient is receiving ketorolac (Toradol)). 30 tablet 0       Frances Rosen

## 2021-08-19 ENCOUNTER — APPOINTMENT (OUTPATIENT)
Dept: URGENT CARE | Facility: CLINIC | Age: 31
End: 2021-08-19
Payer: MEDICAID

## 2021-08-21 ENCOUNTER — OFFICE VISIT (OUTPATIENT)
Dept: URGENT CARE | Facility: CLINIC | Age: 31
End: 2021-08-21
Payer: MEDICAID

## 2021-08-21 VITALS
HEART RATE: 75 BPM | DIASTOLIC BLOOD PRESSURE: 60 MMHG | BODY MASS INDEX: 30.22 KG/M2 | HEIGHT: 62 IN | RESPIRATION RATE: 18 BRPM | OXYGEN SATURATION: 96 % | SYSTOLIC BLOOD PRESSURE: 118 MMHG | WEIGHT: 164.2 LBS | TEMPERATURE: 98.5 F

## 2021-08-21 DIAGNOSIS — L29.9 ITCHING OF EAR: ICD-10-CM

## 2021-08-21 PROCEDURE — 99202 OFFICE O/P NEW SF 15 MIN: CPT | Performed by: FAMILY MEDICINE

## 2021-08-21 RX ORDER — DESONIDE 0.5 MG/G
CREAM TOPICAL
Qty: 15 G | Refills: 1 | Status: SHIPPED | OUTPATIENT
Start: 2021-08-21

## 2021-08-21 RX ORDER — ACETIC ACID 20.65 MG/ML
5 SOLUTION AURICULAR (OTIC) 3 TIMES DAILY
Qty: 15 ML | Refills: 0 | Status: SHIPPED | OUTPATIENT
Start: 2021-08-21 | End: 2021-08-26

## 2021-08-21 ASSESSMENT — FIBROSIS 4 INDEX: FIB4 SCORE: 0.3

## 2021-08-21 NOTE — PROGRESS NOTES
"Subjective     Frances Sandra Rosen is a 30 y.o. female who presents with Other (pt is having itchy, swelling, and burning ears x few years (off and on ))    - This is a pleasant and nontoxic appearing 30 y.o. female with c/o itchy ears x 2-3 years. Some weeks are better then others. Nothing she can think of to have caused it. No trauma or new products or NVFC. No ear bleeding or ear dc or hearing changes       ALLERGIES:  Patient has no known allergies.     PMH:  History reviewed. No pertinent past medical history.     PSH:  History reviewed. No pertinent surgical history.    MEDS:    Current Outpatient Medications:   •  acetic acid (VOSOL) 2 % otic solution, Administer 5 Drops into affected ear(s) 3 times a day for 5 days., Disp: 15 mL, Rfl: 0  •  desonide (TRIDESILON) 0.05 % Cream, AAA BID x 5 days, Disp: 15 g, Rfl: 1  •  Prenatal Vit-Fe Fumarate-FA (PRENATAL PO), Take 1 Tab by mouth every day., Disp: , Rfl:     ** I have documented what I find to be significant in regards to past medical, social, family and surgical history  in my HPI or under PMH/PSH/FH review section, otherwise it is noncontributory **             HPI    Review of Systems   HENT: Positive for ear pain.    All other systems reviewed and are negative.             Objective     /60 (BP Location: Left arm, Patient Position: Sitting, BP Cuff Size: Adult)   Pulse 75   Temp 36.9 °C (98.5 °F) (Temporal)   Resp 18   Ht 1.575 m (5' 2\")   Wt 74.5 kg (164 lb 3.2 oz)   LMP 12/15/2020   SpO2 96%   BMI 30.03 kg/m²      Physical Exam  Vitals and nursing note reviewed.   Constitutional:       General: She is not in acute distress.     Appearance: Normal appearance. She is well-developed.   HENT:      Head: Normocephalic and atraumatic.      Right Ear: Tympanic membrane, ear canal and external ear normal. There is no impacted cerumen.      Left Ear: Tympanic membrane, ear canal and external ear normal. There is no impacted cerumen.      " Mouth/Throat:      Mouth: Mucous membranes are moist.      Pharynx: Oropharynx is clear.   Eyes:      General: No scleral icterus.  Cardiovascular:      Heart sounds: Normal heart sounds. No murmur heard.     Pulmonary:      Effort: Pulmonary effort is normal. No respiratory distress.   Skin:     Coloration: Skin is not jaundiced or pale.   Neurological:      Mental Status: She is alert.      Motor: No abnormal muscle tone.   Psychiatric:         Mood and Affect: Mood normal.         Behavior: Behavior normal.              Assessment & Plan          1. Itching of ear  acetic acid (VOSOL) 2 % otic solution    desonide (TRIDESILON) 0.05 % Cream    REFERRAL TO ENT       - Dx, plan & d/c instructions discussed w/ patient   - Rest, stay hydrated OTC Motrin and/or Tylenol as needed  - E.R. precautions discussed     Asked to kindly follow up with their PCP's office in 2-3 days for a recheck, ER if not improving or feeling/getting worse.    Any realistic side effects of medications that may have been given today reviewed.     Patient left in stable condition      reviewed if narcotics given     POCT results reviewed/discussed    Radiology imaging readings reviewed/discussed

## 2022-01-07 ENCOUNTER — NON-PROVIDER VISIT (OUTPATIENT)
Dept: URGENT CARE | Facility: PHYSICIAN GROUP | Age: 32
End: 2022-01-07

## 2022-01-07 DIAGNOSIS — Z02.1 PRE-EMPLOYMENT DRUG SCREENING: ICD-10-CM

## 2022-01-07 LAB
AMP AMPHETAMINE: NORMAL
COC COCAINE: NORMAL
INT CON NEG: NORMAL
INT CON POS: NORMAL
MET METHAMPHETAMINES: NORMAL
OPI OPIATES: NORMAL
PCP PHENCYCLIDINE: NORMAL
POC DRUG COMMENT 753798-OCCUPATIONAL HEALTH: NORMAL
THC: NORMAL

## 2022-01-07 PROCEDURE — 80305 DRUG TEST PRSMV DIR OPT OBS: CPT | Performed by: NURSE PRACTITIONER

## 2023-01-10 ENCOUNTER — NON-PROVIDER VISIT (OUTPATIENT)
Dept: URGENT CARE | Facility: PHYSICIAN GROUP | Age: 33
End: 2023-01-10

## 2023-01-10 DIAGNOSIS — Z02.1 PRE-EMPLOYMENT DRUG SCREENING: ICD-10-CM

## 2023-01-10 LAB
AMP AMPHETAMINE: NORMAL
COC COCAINE: NORMAL
INT CON NEG: NORMAL
INT CON POS: NORMAL
MET METHAMPHETAMINES: NORMAL
OPI OPIATES: NORMAL
PCP PHENCYCLIDINE: NORMAL
POC DRUG COMMENT 753798-OCCUPATIONAL HEALTH: NEGATIVE
THC: NORMAL

## 2023-01-10 PROCEDURE — 80305 DRUG TEST PRSMV DIR OPT OBS: CPT | Performed by: PHYSICIAN ASSISTANT

## 2023-02-20 ENCOUNTER — NON-PROVIDER VISIT (OUTPATIENT)
Dept: URGENT CARE | Facility: PHYSICIAN GROUP | Age: 33
End: 2023-02-20

## 2023-02-20 DIAGNOSIS — Z02.1 PRE-EMPLOYMENT DRUG SCREENING: Primary | ICD-10-CM

## 2023-02-20 LAB
AMP AMPHETAMINE: NEGATIVE
COC COCAINE: NEGATIVE
INT CON NEG: NORMAL
INT CON POS: NORMAL
MET METHAMPHETAMINES: NEGATIVE
OPI OPIATES: NEGATIVE
PCP PHENCYCLIDINE: NEGATIVE
POC DRUG COMMENT 753798-OCCUPATIONAL HEALTH: NEGATIVE
THC: POSITIVE

## 2023-02-20 PROCEDURE — 80305 DRUG TEST PRSMV DIR OPT OBS: CPT | Performed by: PHYSICIAN ASSISTANT

## 2023-03-08 ENCOUNTER — EH NON-PROVIDER (OUTPATIENT)
Dept: OCCUPATIONAL MEDICINE | Facility: CLINIC | Age: 33
End: 2023-03-08

## 2023-03-08 DIAGNOSIS — Z02.83 ENCOUNTER FOR DRUG SCREENING: ICD-10-CM

## 2023-03-08 PROCEDURE — 8911 PR MRO FEE: Performed by: NURSE PRACTITIONER

## 2024-07-19 ENCOUNTER — OCCUPATIONAL MEDICINE (OUTPATIENT)
Dept: URGENT CARE | Facility: PHYSICIAN GROUP | Age: 34
End: 2024-07-19
Payer: COMMERCIAL

## 2024-07-19 VITALS
WEIGHT: 177 LBS | OXYGEN SATURATION: 98 % | SYSTOLIC BLOOD PRESSURE: 122 MMHG | BODY MASS INDEX: 32.57 KG/M2 | DIASTOLIC BLOOD PRESSURE: 70 MMHG | HEIGHT: 62 IN | HEART RATE: 93 BPM | RESPIRATION RATE: 18 BRPM | TEMPERATURE: 96.8 F

## 2024-07-19 DIAGNOSIS — R10.31 RIGHT LOWER QUADRANT ABDOMINAL PAIN: ICD-10-CM

## 2024-07-19 PROCEDURE — 99213 OFFICE O/P EST LOW 20 MIN: CPT | Performed by: NURSE PRACTITIONER

## 2024-07-19 PROCEDURE — 3074F SYST BP LT 130 MM HG: CPT | Performed by: NURSE PRACTITIONER

## 2024-07-19 PROCEDURE — 3078F DIAST BP <80 MM HG: CPT | Performed by: NURSE PRACTITIONER

## 2024-07-19 ASSESSMENT — ENCOUNTER SYMPTOMS
CONSTIPATION: 0
NAUSEA: 0
MYALGIAS: 0
DIARRHEA: 0
VOMITING: 0
BACK PAIN: 0
BLOOD IN STOOL: 0
FEVER: 0
CHILLS: 0
ABDOMINAL PAIN: 1

## 2024-07-22 ENCOUNTER — APPOINTMENT (OUTPATIENT)
Dept: RADIOLOGY | Facility: MEDICAL CENTER | Age: 34
End: 2024-07-22
Attending: EMERGENCY MEDICINE
Payer: MEDICAID

## 2024-07-22 ENCOUNTER — HOSPITAL ENCOUNTER (EMERGENCY)
Facility: MEDICAL CENTER | Age: 34
End: 2024-07-22
Attending: EMERGENCY MEDICINE
Payer: MEDICAID

## 2024-07-22 VITALS
RESPIRATION RATE: 16 BRPM | HEART RATE: 96 BPM | HEIGHT: 62 IN | DIASTOLIC BLOOD PRESSURE: 80 MMHG | OXYGEN SATURATION: 99 % | SYSTOLIC BLOOD PRESSURE: 148 MMHG | TEMPERATURE: 98 F | BODY MASS INDEX: 32.66 KG/M2 | WEIGHT: 177.47 LBS

## 2024-07-22 DIAGNOSIS — N81.2 CERVICAL PROLAPSE: ICD-10-CM

## 2024-07-22 DIAGNOSIS — B96.89 BACTERIAL VAGINOSIS: ICD-10-CM

## 2024-07-22 DIAGNOSIS — N76.0 BACTERIAL VAGINOSIS: ICD-10-CM

## 2024-07-22 LAB
ALBUMIN SERPL BCP-MCNC: 4.2 G/DL (ref 3.2–4.9)
ALBUMIN/GLOB SERPL: 1.4 G/DL
ALP SERPL-CCNC: 80 U/L (ref 30–99)
ALT SERPL-CCNC: 14 U/L (ref 2–50)
ANION GAP SERPL CALC-SCNC: 13 MMOL/L (ref 7–16)
AST SERPL-CCNC: 13 U/L (ref 12–45)
BASOPHILS # BLD AUTO: 0.4 % (ref 0–1.8)
BASOPHILS # BLD: 0.03 K/UL (ref 0–0.12)
BILIRUB SERPL-MCNC: 0.3 MG/DL (ref 0.1–1.5)
BUN SERPL-MCNC: 8 MG/DL (ref 8–22)
CALCIUM ALBUM COR SERPL-MCNC: 9.1 MG/DL (ref 8.5–10.5)
CALCIUM SERPL-MCNC: 9.3 MG/DL (ref 8.5–10.5)
CANDIDA DNA VAG QL PROBE+SIG AMP: NEGATIVE
CHLORIDE SERPL-SCNC: 102 MMOL/L (ref 96–112)
CO2 SERPL-SCNC: 25 MMOL/L (ref 20–33)
CREAT SERPL-MCNC: 0.57 MG/DL (ref 0.5–1.4)
EOSINOPHIL # BLD AUTO: 0.09 K/UL (ref 0–0.51)
EOSINOPHIL NFR BLD: 1.3 % (ref 0–6.9)
ERYTHROCYTE [DISTWIDTH] IN BLOOD BY AUTOMATED COUNT: 48.9 FL (ref 35.9–50)
G VAGINALIS DNA VAG QL PROBE+SIG AMP: POSITIVE
GFR SERPLBLD CREATININE-BSD FMLA CKD-EPI: 122 ML/MIN/1.73 M 2
GLOBULIN SER CALC-MCNC: 3 G/DL (ref 1.9–3.5)
GLUCOSE SERPL-MCNC: 121 MG/DL (ref 65–99)
HCG SERPL QL: NEGATIVE
HCT VFR BLD AUTO: 45 % (ref 37–47)
HGB BLD-MCNC: 15 G/DL (ref 12–16)
IMM GRANULOCYTES # BLD AUTO: 0.02 K/UL (ref 0–0.11)
IMM GRANULOCYTES NFR BLD AUTO: 0.3 % (ref 0–0.9)
LYMPHOCYTES # BLD AUTO: 1.46 K/UL (ref 1–4.8)
LYMPHOCYTES NFR BLD: 21.5 % (ref 22–41)
MCH RBC QN AUTO: 30.5 PG (ref 27–33)
MCHC RBC AUTO-ENTMCNC: 33.3 G/DL (ref 32.2–35.5)
MCV RBC AUTO: 91.6 FL (ref 81.4–97.8)
MONOCYTES # BLD AUTO: 0.49 K/UL (ref 0–0.85)
MONOCYTES NFR BLD AUTO: 7.2 % (ref 0–13.4)
NEUTROPHILS # BLD AUTO: 4.7 K/UL (ref 1.82–7.42)
NEUTROPHILS NFR BLD: 69.3 % (ref 44–72)
NRBC # BLD AUTO: 0 K/UL
NRBC BLD-RTO: 0 /100 WBC (ref 0–0.2)
PLATELET # BLD AUTO: 354 K/UL (ref 164–446)
PMV BLD AUTO: 9.5 FL (ref 9–12.9)
POTASSIUM SERPL-SCNC: 4.3 MMOL/L (ref 3.6–5.5)
PROT SERPL-MCNC: 7.2 G/DL (ref 6–8.2)
RBC # BLD AUTO: 4.91 M/UL (ref 4.2–5.4)
SODIUM SERPL-SCNC: 140 MMOL/L (ref 135–145)
T VAGINALIS DNA VAG QL PROBE+SIG AMP: NEGATIVE
WBC # BLD AUTO: 6.8 K/UL (ref 4.8–10.8)

## 2024-07-22 PROCEDURE — 87510 GARDNER VAG DNA DIR PROBE: CPT

## 2024-07-22 PROCEDURE — 87480 CANDIDA DNA DIR PROBE: CPT

## 2024-07-22 PROCEDURE — 85025 COMPLETE CBC W/AUTO DIFF WBC: CPT

## 2024-07-22 PROCEDURE — 99284 EMERGENCY DEPT VISIT MOD MDM: CPT

## 2024-07-22 PROCEDURE — 87591 N.GONORRHOEAE DNA AMP PROB: CPT

## 2024-07-22 PROCEDURE — 36415 COLL VENOUS BLD VENIPUNCTURE: CPT

## 2024-07-22 PROCEDURE — 87660 TRICHOMONAS VAGIN DIR PROBE: CPT

## 2024-07-22 PROCEDURE — 87491 CHLMYD TRACH DNA AMP PROBE: CPT

## 2024-07-22 PROCEDURE — 76705 ECHO EXAM OF ABDOMEN: CPT

## 2024-07-22 PROCEDURE — 76830 TRANSVAGINAL US NON-OB: CPT

## 2024-07-22 PROCEDURE — 84703 CHORIONIC GONADOTROPIN ASSAY: CPT

## 2024-07-22 PROCEDURE — 80053 COMPREHEN METABOLIC PANEL: CPT

## 2024-07-22 RX ORDER — METRONIDAZOLE 500 MG/1
500 TABLET ORAL 2 TIMES DAILY
Qty: 14 TABLET | Refills: 0 | Status: ACTIVE | OUTPATIENT
Start: 2024-07-22 | End: 2024-07-29

## 2024-07-22 ASSESSMENT — PAIN DESCRIPTION - PAIN TYPE: TYPE: ACUTE PAIN

## 2024-07-23 LAB
C TRACH DNA GENITAL QL NAA+PROBE: POSITIVE
N GONORRHOEA DNA GENITAL QL NAA+PROBE: NEGATIVE
SPECIMEN SOURCE: ABNORMAL

## 2024-07-26 RX ORDER — AZITHROMYCIN 500 MG/1
1000 TABLET, FILM COATED ORAL ONCE
Qty: 2 TABLET | Refills: 0 | Status: ACTIVE | OUTPATIENT
Start: 2024-07-26 | End: 2024-07-26

## 2024-08-02 ENCOUNTER — OCCUPATIONAL MEDICINE (OUTPATIENT)
Dept: URGENT CARE | Facility: PHYSICIAN GROUP | Age: 34
End: 2024-08-02
Payer: COMMERCIAL

## 2024-08-02 VITALS
HEIGHT: 62 IN | WEIGHT: 179.2 LBS | HEART RATE: 64 BPM | OXYGEN SATURATION: 100 % | TEMPERATURE: 98.3 F | SYSTOLIC BLOOD PRESSURE: 120 MMHG | BODY MASS INDEX: 32.97 KG/M2 | RESPIRATION RATE: 16 BRPM | DIASTOLIC BLOOD PRESSURE: 80 MMHG

## 2024-08-02 DIAGNOSIS — N81.2 CERVICAL PROLAPSE: ICD-10-CM

## 2024-08-02 PROCEDURE — 3079F DIAST BP 80-89 MM HG: CPT | Performed by: NURSE PRACTITIONER

## 2024-08-02 PROCEDURE — 99213 OFFICE O/P EST LOW 20 MIN: CPT | Performed by: NURSE PRACTITIONER

## 2024-08-02 PROCEDURE — 3074F SYST BP LT 130 MM HG: CPT | Performed by: NURSE PRACTITIONER

## 2024-08-02 RX ORDER — AZITHROMYCIN 250 MG/1
500 TABLET, FILM COATED ORAL DAILY
COMMUNITY
End: 2024-08-26

## 2024-08-02 ASSESSMENT — FIBROSIS 4 INDEX: FIB4 SCORE: 0.33

## 2024-08-02 NOTE — LETTER
"    EMPLOYEE’S CLAIM FOR COMPENSATION/ REPORT OF INITIAL TREATMENT  FORM C-4  PLEASE TYPE OR PRINT    EMPLOYEE’S CLAIM - PROVIDE ALL INFORMATION REQUESTED   First Name                    BALDEMAR Daniels                  Last Name  Mani Rosen Birthdate                    1990                Sex  Female Claim Number (Insurer’s Use Only)     Home Address  [address] Age  33 y.o. Height  1.575 m (5' 2\") Weight  81.3 kg (179 lb 3.2 oz) Social Security Number     [facility] [place] Zip  [address] Telephone  [phone] (home)    Mailing Address  [address] Zip  [address] Primary Language Spoken  English    INSURER   THIRD-PARTY   Shanna Garcia   Employee's Occupation (Job Title) When Injury or Occupational Disease Occurred      Employer's Name/Company Name     Telephone  [phone]    Office Mail Address (Number and Street)  [address]     Date of Injury (if applicable) 7/18/2024               Hours Injury (if applicable)  4:00 PM Date Employer Notified  7/19/2024 Last Day of Work after Injury or Occupational Disease  7/18/2024 Supervisor to Whom Injury Reported  [facility]   Address or Location of Accident (if applicable)  Work [1]   What were you doing at the time of accident? (if applicable)  LIFTING HEAVY BOXES    How did this injury or occupational disease occur? (Be specific and answer in detail. Use additional sheet if necessary)  LIFTING HEAVY BOXES   If you believe that you have an occupational disease, when did you first have knowledge of the disability and its relationship to your employment?  NA Witnesses to the Accident (if applicable)  NA      Nature of Injury or Occupational Disease  Workers' Compensation  Part(s) of Body Injured or Affected  Pelvis N/A N/A    I CERTIFY THAT THE ABOVE IS TRUE AND CORRECT TO T HE BEST OF MY KNOWLEDGE AND THAT I " HAVE PROVIDED THIS INFORMATION IN ORDER TO OBTAIN THE BENEFITS OF [place], OR [place]  I HEREBY AUTHORIZE ANY PHYSICIAN, CHIROPRACTOR, SURGEON, PRACTITIONER OR ANY OTHER PERSON, ANY HOSPITAL, INCLUDING [facility] OR [facility], ANY  MEDICAL SERVICE ORGANIZATION, ANY INSURANCE COMPANY, OR OTHER INSTITUTION OR ORGANIZATION TO RELEASE TO EACH OTHER, ANY MEDICAL OR OTHER INFORMATION, INCLUDING BENEFITS PAID OR PAYABLE, PERTINENT TO THIS INJURY OR DISEASE, EXCEPT INFORMATION RELATIVE TO DIAGNOSIS, TREATMENT AND/OR COUNSELING FOR AIDS, PSYCHOLOGICAL CONDITIONS, ALCOHOL OR CONTROLLED SUBSTANCES, FOR WHICH I MUST GIVE SPECIFIC AUTHORIZATION.  A PHOTOSTAT OF THIS AUTHORIZATION SHALL BE VALID AS THE ORIGINAL.     Date   Place Employee’s Original or  *Electronic Signature   THIS REPORT MUST BE COMPLETED AND MAILED WITHIN 3 WORKING DAYS OF TREATMENT   Place  [facility]    Name of Facility  [facility]   Date 8/2/2024 Diagnosis and Description of Injury or Occupational Disease  (N81.2) Cervical prolapse  The encounter diagnosis was Cervical prolapse. Is there evidence that the injured employee was under the influence of alcohol and/or another controlled substance at the time of accident?  []No  [] Yes (if yes, please explain)   Hour 1:11 PM  No   Treatment: Continue with lifting restrictions. No further incidents reported. Consulting with [facility] on 8/26/24.     Have you advised the patient to remain off work five days or more?   [] Yes Indicate dates: From   To    [] No      If no, is the injured employee capable of: [] full duty [] modified duty                     If modified duty, specify any limitations / restrictions:  No lifting more than 20lbs.                                                                                                                                                                                                                                                                                                                                                                                                                 X-Ray Findings:   Comments:review US from ER    From information given by the employee, together with medical evidence, can you directly connect this injury or occupational disease as job incurred?  []Yes   [] No Yes    Is additional medical care by a physician indicated? []Yes [] No  Yes    Do you know of any previous injury or disease contributing to this condition or occupational disease? []Yes [] No (Explain if yes)                          No   Date  8/2/2024 Print Health Care Provider’s Name  JESUS Platt I certify that the employer’s copy of  this form was delivered to the employer on:   Address  [address] INSURER'S USE ONLY                       [place]  [address] Provider’s Tax ID Number  [id number]   Telephone  Dept: [phone]    Health Care Provider’s Original or Electronic Signature  e-ROSALIA Durham Degree (MD,DO, DC,PA-C,APRN)  APRN  Choose (if applicable)      ORIGINAL - TREATING HEALTHCARE PROVIDER PAGE 2 - INSURER/TPA PAGE 3 - EMPLOYER PAGE 4 - EMPLOYEE             Form C-4 (rev.08/23)

## 2024-08-02 NOTE — LETTER
PHYSICIAN’S AND CHIROPRACTIC PHYSICIAN'S   PROGRESS REPORT CERTIFICATION OF DISABILITY Claim Number:     Social Security Number:    Patient’s Name:Frances Rosen Date of Injury: 7/18/2024   Employer:  [employer] Name of [employer] (if applicable)      Patient’s Job Description/Occupation:        Previous Injuries/Diseases/Surgeries Contributing to the Condition:  None        Diagnosis:    1. Cervical prolapse        N81.2                Related to the Industrial Injury? Yes     Explain:Lifting item at work more than 70 lbs and felt something fall out of vagina, painful.           Objective Medical Findings:No abdominal pain. No reoccurrence of prolapse        Cervical prolapse N81.2        None - Discharged                         Stable  Yes                 Ratable  No     X  Generally Improved                        Condition Worsened               X  Condition Same  May Have Suffered a Permanent Disability  No     Treatment Plan:    Continue lifting restrictions, IBU or Tylenol as needed. GYN consult 8/26/24. Return for reeval in 2 weeks.           X  No Change in Therapy                 PT/OT Prescribed                   X  Medication May be Used While Working       Case Management                        PT/OT Discontinued  X  Consultation    Further Diagnostic Studies:                               Prescription(s) [facility] 8/26/2024                           Released to FULL DUTY /No Restrictions on (Date):  From:      Certified TOTALLY TEMPORARILY DISABLED (Indicate Dates) From:   To:    X  Released to RES TRICTED/Modified Duty on (Date): From: 8/2/2024 To: 8/16/2024                                                               Restrictions Are:  Temporary     No Sitting                               No Standing                 X  No Pulling                  Other: Decreased intraabdominal pressure until evaluated by GYN  X  No Bending at Waist         X  No Stooping                   X  No Lifting     X  No Carrying                           No Walking                Lifting Restricted to (lbs.):  < or = to 25 pounds  X  No Pushing                            No Climbing                   No Reaching Above Shoulders   Date of Next Visit:  8/16/2024 Date of this Exam:8/2/2024 Physician/Chiropractic Physician Name:JESUS Platt Physician/Chiropractic Physician Signature:  Jayjay Oden DO MPH   D-39 (Rev. 2/24)

## 2024-08-02 NOTE — PROGRESS NOTES
"  Chief Complaint   Patient presents with    Follow-Up      DOI 07/18/24  Pelvic area; feeling okay, still experiencing pain but not as bad as when the injury first happened        HISTORY OF PRESENT ILLNESS: Patient is a pleasant 33 y.o. female who presents to urgent care today with a work comp complaint of vaginal pain    Visit#1 07/19/2024: Associated symptoms include abdominal pain. Pertinent negatives include no chills, fever, myalgias, nausea or vomiting.   Frances has come into urgent care as she was lifting 70# boxes at work when she had discomfort in her RLQ abdomen and well as a lump out of her vaginal canal. Denies dysuria, hematuria, difficulty with bowel movements. Denies vaginal discharge or bleeding. States has been performing kegels which has decreased lump out of vaginal canal.     No abdominal pain. No reoccurrence of prolapse        Cervical prolapse N81.2Visit #2 ER 07/22/2024: Frances Rosen is a 33 y.o. female who presents to the Emergency Department for pelvic pain. Patient says she has pelvic pain on her right side for the past 4 days. Patient says she was lifting something heavy straight upwards when she felt something \"drop out of (her) vagina.\" Denies dysuria or kidney problems. Patient says she suspects a prolapsed uterus. Patient denies any similar experiences in the past. Patient denies any other illness or injury.     PMH: No pertinent past medical history to this problem  MEDS: Medications were reviewed in Epic  ALLERGIES: Allergies were reviewed in Epic  SOCHX: Works in a warehouse.   FH: No pertinent family history to this problem    ROS  As per original HPINo abdominal pain. No reoccurrence of prolapse        Cervical prolapse N81.2    Physical Exam  Constitutional:       Appearance: Normal appearance.   HENT:      Head: Normocephalic and atraumatic.      Right Ear: External ear normal.      Left Ear: External ear normal.      Nose: Nose normal.      Mouth/Throat:      " Mouth: Mucous membranes are moist.      Pharynx: Oropharynx is clear.   Eyes:      Extraocular Movements: Extraocular movements intact.      Conjunctiva/sclera: Conjunctivae normal.      Pupils: Pupils are equal, round, and reactive to light.   Cardiovascular:      Rate and Rhythm: Normal rate and regular rhythm.      Pulses: Normal pulses.      Heart sounds: Normal heart sounds.   Pulmonary:      Effort: Pulmonary effort is normal.      Breath sounds: Normal breath sounds.   Genitourinary:     Comments: Not required today  Musculoskeletal:         General: Normal range of motion.      Cervical back: Normal range of motion and neck supple.   Skin:     General: Skin is warm and dry.   Neurological:      General: No focal deficit present.      Mental Status: She is alert.               Assessment/Plan:      FROM   TO  8/2/2024 to 8/16/2024  Decreased intraabdominal pressure until evaluated by GYN   Continue with modified duty, no lifting more than 20 pounds.  No pushing, pulling lifting, bending at waist, or stooping.  Return to clinic in 2 weeks for reevaluation.  Awaiting consultation with [facility] 8/26/2024.    Medication discussion include its use and potential benefits and side effects.  Education was provided regarding the aforementioned exam findings.  All of the patient's/parents questions were answered to their satisfaction prior to discharging the patient.  Encouraged to monitor symptoms closely and together we reviewed the signs and symptoms which cause concern for return to clinic or emergency department evaluation. Patient verbalized stated agreement with understanding of plan.      Please note that this dictation was created using voice recognition software. I have made every reasonable attempt to correct obvious errors, but I expect that there are errors of grammar and possibly content that I did not discover before finalizing the note.      KARLI Platt.

## 2024-08-02 NOTE — LETTER
PHYSICIAN’S AND CHIROPRACTIC PHYSICIAN'S   PROGRESS REPORT CERTIFICATION OF DISABILITY Claim Number:     Social Security Number:    Patient’s Name:Frances Rosen Date of Injury: 7/18/2024   Employer:   Name of [employer] (if applicable)      Patient’s Job Description/Occupation:        Previous Injuries/Diseases/Surgeries Contributing to the Condition:  None        Diagnosis:    1. Cervical prolapse                        Related to the Industrial Injury? Yes     Explain:Lifting item at work more than 70 lbs and felt something fall out of vagina, painful.         Objective Medical Findings:No abdominal pain. No reoccurrence of prolapse        None - Discharged                         Stable  Yes                 Ratable  No     X  Generally Improved                        Condition Worsened               X  Condition Same  May Have Suffered a Permanent Disability  No     Treatment Plan:    Continue lifting restrictions, IBU or Tylenol as needed. GYN consult 8/26/24. Return for reeval in 2 weeks.       X  No Change in Therapy                 PT/OT Prescribed                   X  Medication May be Used While Working       Case Management                        PT/OT Discontinued  X  Consultation    Further Diagnostic Studies:                               Prescription(s) [facility] 8/26/2024                           Released to FULL DUTY /No Restrictions on (Date):  From:      Certified TOTALLY TEMPORARILY DISABLED (Indicate Dates) From:   To:    X  Released to RES TRICTED/Modified Duty on (Date): From: 8/2/2024 To: 8/16/2024                                                               Restrictions Are:  Temporary     No Sitting                               No Standing                 X  No Pulling                  Other: Decreased intraabdominal pressure until evaluated by GYN  X  No Bending at Waist         X  No Stooping                  X  No Lifting     X  No Carrying                            No Walking                Lifting Restricted to (lbs.):  < or = to 25 pounds  X  No Pushing                            No Climbing                   No Reaching Above Shoulders   Date of Next Visit:  8/16/2024 Date of this Exam:8/2/2024 Physician/Chiropractic Physician Name:JESUS Platt Physician/Chiropractic Physician Signature:  Jayjay Oden DO MPH   D-39 (Rev. 2/24)

## 2024-08-23 ENCOUNTER — OCCUPATIONAL MEDICINE (OUTPATIENT)
Dept: URGENT CARE | Facility: PHYSICIAN GROUP | Age: 34
End: 2024-08-23
Payer: COMMERCIAL

## 2024-08-23 VITALS
DIASTOLIC BLOOD PRESSURE: 74 MMHG | WEIGHT: 179 LBS | SYSTOLIC BLOOD PRESSURE: 118 MMHG | HEIGHT: 62 IN | TEMPERATURE: 97.5 F | RESPIRATION RATE: 14 BRPM | HEART RATE: 68 BPM | OXYGEN SATURATION: 98 % | BODY MASS INDEX: 32.94 KG/M2

## 2024-08-23 DIAGNOSIS — N81.2 CERVICAL PROLAPSE: ICD-10-CM

## 2024-08-23 PROCEDURE — 99214 OFFICE O/P EST MOD 30 MIN: CPT | Performed by: STUDENT IN AN ORGANIZED HEALTH CARE EDUCATION/TRAINING PROGRAM

## 2024-08-23 PROCEDURE — 3078F DIAST BP <80 MM HG: CPT | Performed by: STUDENT IN AN ORGANIZED HEALTH CARE EDUCATION/TRAINING PROGRAM

## 2024-08-23 PROCEDURE — 3074F SYST BP LT 130 MM HG: CPT | Performed by: STUDENT IN AN ORGANIZED HEALTH CARE EDUCATION/TRAINING PROGRAM

## 2024-08-23 ASSESSMENT — ENCOUNTER SYMPTOMS
VOMITING: 0
DIARRHEA: 0
ABDOMINAL PAIN: 0
FEVER: 0
NAUSEA: 0
CONSTIPATION: 0

## 2024-08-23 ASSESSMENT — FIBROSIS 4 INDEX: FIB4 SCORE: 0.33

## 2024-08-23 NOTE — LETTER
PHYSICIAN’S AND CHIROPRACTIC PHYSICIAN'S   PROGRESS REPORT CERTIFICATION OF DISABILITY Claim Number:     Social Security Number:    Patient’s Name: Frances Rosen Date of Injury: 7/18/2024   Employer:   Name of [employer] (if applicable):      Patient’s Job Description/Occupation: @[id number](CLM,312,1,,,,,2) )@       Previous Injuries/Diseases/Surgeries Contributing to the Condition:         Diagnosis: (N81.2) Cervical prolapse      Related to the Industrial Injury? Yes     Explain: Unknown.      Objective Medical Findings: Abdominal: Abdomen is soft. There is generalized lower abdominal tenderness. No rebound or guarding.         None - Discharged                         Stable  No                 Ratable  No        Generally Improved                         Condition Worsened               X   Condition Same  May Have Suffered a Permanent Disability No     Treatment Plan:    Follow up with OB (8/26/24). Transfer of care to [facility].         No Change in Therapy                  PT/OT Prescribed                      Medication May be Used While Working        Case Management                          PT/OT Discontinued    Consultation    Further Diagnostic Studies:    Prescription(s)                 Released to FULL DUTY /No Restrictions on (Date):  From:      Certified TOTALLY TEMPORARILY DISABLED (Indicate Dates) From:   To:    X  Released to RESTRICTED/Modified Duty on (Date): From: 8/23/2024 To: 9/6/2024  Restrictions Are:         No Sitting    No Standing X   No Pulling Other:         No Bending at Waist     No Stooping     No Lifting        No Carrying     No Walking Lifting Restricted to (lbs.):  < or = to 25 pounds       No Pushing        No Climbing     No Reaching Above Shoulders       Date of Next Visit:   8/30/24 Date of this Exam: 8/23/2024 Physician/Chiropractic Physician Name: Joann Hyatt P.A.-C. Physician/Chiropractic Physician Signature:  Jayjay Oden DO MPH                                                                                                                                                                                                             D-39 (Rev. 2/24)

## 2024-08-23 NOTE — LETTER
PHYSICIAN’S AND CHIROPRACTIC PHYSICIAN'S   PROGRESS REPORT CERTIFICATION OF DISABILITY Claim Number:     Social Security Number: [id number]   Patient’s Name: Frances Rosen Date of Injury: 7/18/2024   Employer:  [employer] Name of [employer] (if applicable):      Patient’s Job Description/Occupation:       Previous Injuries/Diseases/Surgeries Contributing to the Condition:         Diagnosis: (N81.2) Cervical prolapse      Related to the Industrial Injury? Yes     Explain: Unknown.      Objective Medical Findings: Abdominal: Abdomen is soft. There is generalized lower abdominal tenderness. No rebound or guarding.         None - Discharged                         Stable  No                 Ratable  No        Generally Improved                         Condition Worsened               X   Condition Same  May Have Suffered a Permanent Disability No     Treatment Plan:    Follow up with OB (8/26/24). Transfer of care to [facility].         No Change in Therapy                  PT/OT Prescribed                      Medication May be Used While Working        Case Management                          PT/OT Discontinued    Consultation    Further Diagnostic Studies:    Prescription(s)                 Released to FULL DUTY /No Restrictions on (Date):  From:      Certified TOTALLY TEMPORARILY DISABLED (Indicate Dates) From:   To:    X  Released to RESTRICTED/Modified Duty on (Date): From: 8/23/2024 To: 9/6/2024  Restrictions Are:         No Sitting    No Standing X   No Pulling Other:         No Bending at Waist     No Stooping     No Lifting        No Carrying     No Walking Lifting Restricted to (lbs.):  < or = to 25 pounds       No Pushing        No Climbing     No Reaching Above Shoulders       Date of Next Visit:   8/30/2024 Date of this Exam: 8/23/2024 Physician/Chiropractic Physician Name: Joann Hyatt P.A.-C. Physician/Chiropractic Physician Signature:  Jayjay Oden DO MPH                                                                                                                                                                                                             D-39 (Rev. 2/24)

## 2024-08-23 NOTE — PROGRESS NOTES
"Subjective     Frances Rosen is a 33 y.o. female who presents with Follow-Up ( DOI [id number]/Pelvic area/ ,still the same ,no improvement )            FV#4 (8/23/24): Patient states symptoms are the same. Reports pressure in pelvic/vaginal area that is constantly present. Patient states work comp denied her claim which she is trying to appeal. Patient was diagnosed with cervical prolapse in ER. She has appointment with women's's health on Monday (8/26).  Patient states she recently got a new job.  New job does not require any lifting and it is primarily desk duty which has helped some as she is trying to avoid any heavy lifting due to symptoms.  No fever/chills.  No dysuria.  No vaginal discharge/odor.    HPI    Review of Systems   Constitutional:  Negative for fever.   Gastrointestinal:  Negative for abdominal pain, constipation, diarrhea, nausea and vomiting.   Genitourinary:  Negative for dysuria.   All other systems reviewed and are negative.             Objective     /74   Pulse 68   Temp 36.4 °C (97.5 °F) (Temporal)   Resp 14   Ht 1.575 m (5' 2\")   Wt 81.2 kg (179 lb)   SpO2 98%   BMI 32.74 kg/m²      Physical Exam  Vitals reviewed.   Constitutional:       General: She is not in acute distress.     Appearance: Normal appearance. She is not ill-appearing or toxic-appearing.   HENT:      Head: Normocephalic and atraumatic.      Nose: Nose normal.   Eyes:      Extraocular Movements: Extraocular movements intact.      Conjunctiva/sclera: Conjunctivae normal.      Pupils: Pupils are equal, round, and reactive to light.   Cardiovascular:      Rate and Rhythm: Normal rate.   Pulmonary:      Effort: Pulmonary effort is normal.   Abdominal:      General: Abdomen is flat. Bowel sounds are normal. There is no distension.      Palpations: Abdomen is soft. There is no mass.      Tenderness: There is abdominal tenderness (generalized lower abdominal). There is no guarding or rebound.      Hernia: " No hernia is present.   Neurological:      Mental Status: She is alert.                             Assessment & Plan        Assessment & Plan  Cervical prolapse    Orders:    Referral to Occupational Medicine       D39 completed.  Transfer of care to [facility]. Patient states work comp claim was declined but is trying to appeal.  Follow up with OBGYN on 8/26 as scheduled.    I personally reviewed prior external notes and test results pertinent to today's visit, including work comp visit on 719 and 8/2 and ER visit on 7/22.     Latest Reference Range & Units 07/22/24 18:42   C. trachomatis by PCR Negative  POSITIVE !   Candida species DNA Probe Negative  Negative   Gardnerella vaginalis DNA Probe Negative  POSITIVE !   N. gonorrhoeae by PCR Negative  Negative   Source  Cervical   Trichamonas vaginalis DNA Probe Negative  Negative   !: Data is abnormal    Patient was treated for both chlamydia and gonorrhea and completed antibiotics as prescribed.    US-PELVIC COMPLETE (TRANSABDOMINAL/TRANSVAGINAL) (COMBO) IMPRESSION (7/22/24): Normal transvaginal appearance of the pelvis.    Differential diagnoses, supportive care measures and indications for immediate follow-up discussed with patient. Pathogenesis of diagnosis discussed including typical length and natural progression.      Instructed to return to urgent care or nearest emergency department if symptoms fail to improve, for any change in condition, further concerns, or new concerning symptoms.    Patient states understanding and agrees with the plan of care and discharge instructions.          My total time spent caring for the patient on the day of the encounter was 32 minutes including obtain patient history, physical exam, discussing differential diagnosis, plan of care, supportive care measures, appropriate follow-up indications for immediate follow-up, as well as completing associated work comp paperwork.This does not include time spent on separately billable  procedures/tests.

## 2024-08-26 ENCOUNTER — HOSPITAL ENCOUNTER (OUTPATIENT)
Facility: MEDICAL CENTER | Age: 34
End: 2024-08-26
Attending: OBSTETRICS & GYNECOLOGY
Payer: MEDICAID

## 2024-08-26 ENCOUNTER — GYNECOLOGY VISIT (OUTPATIENT)
Dept: OBGYN | Facility: CLINIC | Age: 34
End: 2024-08-26
Payer: MEDICAID

## 2024-08-26 VITALS
DIASTOLIC BLOOD PRESSURE: 68 MMHG | BODY MASS INDEX: 34.78 KG/M2 | WEIGHT: 189 LBS | HEIGHT: 62 IN | SYSTOLIC BLOOD PRESSURE: 120 MMHG

## 2024-08-26 DIAGNOSIS — Z86.19 HISTORY OF CHLAMYDIA: Primary | ICD-10-CM

## 2024-08-26 DIAGNOSIS — N81.9 FEMALE GENITAL PROLAPSE, UNSPECIFIED TYPE: ICD-10-CM

## 2024-08-26 DIAGNOSIS — R10.2 PELVIC PAIN: ICD-10-CM

## 2024-08-26 DIAGNOSIS — Z12.4 CERVICAL CANCER SCREENING: ICD-10-CM

## 2024-08-26 PROBLEM — Z34.80 SUPERVISION OF OTHER NORMAL PREGNANCY, ANTEPARTUM: Status: RESOLVED | Noted: 2021-02-26 | Resolved: 2024-08-26

## 2024-08-26 LAB
POCT INT CON NEG: NEGATIVE
POCT INT CON POS: POSITIVE
POCT URINE PREGNANCY TEST: NEGATIVE

## 2024-08-26 PROCEDURE — 87491 CHLMYD TRACH DNA AMP PROBE: CPT

## 2024-08-26 PROCEDURE — 87591 N.GONORRHOEAE DNA AMP PROB: CPT

## 2024-08-26 PROCEDURE — 87624 HPV HI-RISK TYP POOLED RSLT: CPT

## 2024-08-26 PROCEDURE — 88175 CYTOPATH C/V AUTO FLUID REDO: CPT

## 2024-08-26 RX ORDER — NORETHINDRONE ACETATE 5 MG
5 TABLET ORAL DAILY
Qty: 21 TABLET | Refills: 6 | Status: SHIPPED | OUTPATIENT
Start: 2024-08-26

## 2024-08-26 ASSESSMENT — FIBROSIS 4 INDEX: FIB4 SCORE: 0.33

## 2024-08-26 NOTE — PATIENT INSTRUCTIONS
Kegel Exercises    Kegel exercises can help strengthen your pelvic floor muscles. The pelvic floor is a group of muscles that support your rectum, small intestine, and bladder. In females, pelvic floor muscles also help support the womb (uterus). These muscles help you control the flow of urine and stool.  Kegel exercises are painless and simple, and they do not require any equipment. Your provider may suggest Kegel exercises to:  Improve bladder and bowel control.  Improve sexual response.  Improve weak pelvic floor muscles after surgery to remove the uterus (hysterectomy) or pregnancy (females).  Improve weak pelvic floor muscles after prostate gland removal or surgery (males).  Kegel exercises involve squeezing your pelvic floor muscles, which are the same muscles you squeeze when you try to stop the flow of urine or keep from passing gas. The exercises can be done while sitting, standing, or lying down, but it is best to vary your position.  Exercises  How to do Kegel exercises:  Squeeze your pelvic floor muscles tight. You should feel a tight lift in your rectal area. If you are a female, you should also feel a tightness in your vaginal area. Keep your stomach, buttocks, and legs relaxed.  Hold the muscles tight for up to 10 seconds.  Breathe normally.  Relax your muscles.  Repeat as told by your health care provider.  Repeat this exercise daily as told by your health care provider. Continue to do this exercise for at least 4-6 weeks, or for as long as told by your health care provider.  You may be referred to a physical therapist who can help you learn more about how to do Kegel exercises.  Depending on your condition, your health care provider may recommend:  Varying how long you squeeze your muscles.  Doing several sets of exercises every day.  Doing exercises for several weeks.  Making Kegel exercises a part of your regular exercise routine.  This information is not intended to replace advice given to you  by your health care provider. Make sure you discuss any questions you have with your health care provider.

## 2024-08-26 NOTE — PROGRESS NOTES
"GYN visit (New pt) possible prolapse  Pt states she noticed a \"ball\" in the vagina opening, states she   noticed it 07/18/2024  LMP: 07/07/2024  Last pap: 02/26/2021 Negative   Pt states she was tested + for Chlamydia on 07/22/2024. Pt states she took medication on 07/26/2024 along with her partner.  Marty # [phone]  "

## 2024-08-26 NOTE — PROGRESS NOTES
New Gynecological Visit    Frances Rosen    33 y.o.    Chief complaint    No chief complaint on file.      HPI    Patient is a 32 yo  who presents as an ER follow up from 24 for acute symptoms of a vaginal bulge and pelvic pain. Patient described symptoms starting on 24 where she felt a 'heavy' sensation in her pelvis followed by a soft bulge at the opening of her vagina. She also developed pelvic pain and thus proceeded to go to Urgent Care then was directed to ER on 24. She tested positive for chlamydia and was given treatment for her and her partner, and rest of workup was normal with normal pelvic ultrasound.   Since that time the bulge has resolved and she states she was trying to do daily kegel exercises but not sure if she is doing them correctly. She also has a dull constant pelvic pain 7/10 and was taking ibuprofen but stopped about 1 month ago as it was not effective. She continues to have this low level dull pelvic pain.   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   She denies straining/constipation. Mild pain with bowel movements. Occasional mild pain with urination.   Sexually active with male partner.   Not on birth control. Patient's last menstrual period was 2024 (exact date).        Review of Systems:  Review of Systems   All other systems reviewed and are negative.       Past Obstetrical History:     x 1 at 33w  SAB x 1    Past Gynecological History:    Last pap:  normal per patient  H/o abnormal pap: Yes, in 2018 and repeat paps normal,.   H/o STIs: hx CT as above, treated  DEXA: N/A  Last Mammogram: N/A  LMP: Patient's last menstrual period was 2024 (exact date).  BCM: None    Past Medical History    Past Medical History:   Diagnosis Date    Patient denies medical problems        Past Surgical History    Past  "Surgical History:   Procedure Laterality Date    NO PERTINENT PAST SURGICAL HISTORY         Family History   Problem Relation Age of Onset    No Known Problems Mother     No Known Problems Father     No Known Problems Sister     No Known Problems Brother        Allergies    No Known Allergies    Medications    Current Outpatient Medications   Medication Sig Dispense Refill     No current facility-administered medications for this visit.       Social  Social History     Tobacco Use    Smoking status: Never    Smokeless tobacco: Never   Vaping Use    Vaping status: Never Used   Substance Use Topics    Alcohol use: Yes     Comment: \"every other weekend.\"    Drug use: Not Currently     Comment: hx marijuana use last 1/2021        OBJECTIVE:    Vitals    /68 (BP Location: Right arm, Patient Position: Sitting, BP Cuff Size: Large adult)   Ht 5' 2\"   Wt 189 lb   LMP 07/07/2024 (Exact Date)   BMI 34.57 kg/m²     Physical Exam    GENERAL: Well developed, well nourished, female in no acute distress.    HEENT: NCAT, mucus membranes moist    Neck: Supple, nontender, no NATHALY, no thyromegaly    CV: RRR    Pulm: CTAB    Abdomen: Soft ND NT.    Ext: JADE    : Normal Vulva, vagina. No lesions present. No abnormal discharge. No blood.    Urethral meatus normal    Cervix smooth pink no lesions, discharge or blood. Pap, GC/CT collected.     Uterus small midline AV mobile moderate tenderness on palpation.     Adnexa no masses or tenderness.     Labs/Pathology:    UPT today     Latest Reference Range & Units 07/22/24 14:45   WBC 4.8 - 10.8 K/uL 6.8   RBC 4.20 - 5.40 M/uL 4.91   Hemoglobin 12.0 - 16.0 g/dL 15.0   Hematocrit 37.0 - 47.0 % 45.0   Platelet Count 164 - 446 K/uL 354      Latest Reference Range & Units 07/22/24 14:45   Sodium 135 - 145 mmol/L 140   Potassium 3.6 - 5.5 mmol/L 4.3   Chloride 96 - 112 mmol/L 102   Co2 20 - 33 mmol/L 25   Anion Gap 7.0 - 16.0  13.0   Glucose 65 - 99 mg/dL 121 (H)   Bun 8 - 22 mg/dL 8 "   Creatinine 0.50 - 1.40 mg/dL 0.57   GFR (CKD-EPI) >60 mL/min/1.73 m 2 122   Calcium 8.5 - 10.5 mg/dL 9.3   Correct Calcium 8.5 - 10.5 mg/dL 9.1   AST(SGOT) 12 - 45 U/L 13   ALT(SGPT) 2 - 50 U/L 14   (H): Data is abnormally high   Latest Reference Range & Units 24 18:42   C. trachomatis by PCR Negative  POSITIVE !   Candida species DNA Probe Negative  Negative   Gardnerella vaginalis DNA Probe Negative  POSITIVE !   N. gonorrhoeae by PCR Negative  Negative   Source  Cervical   Trichamonas vaginalis DNA Probe Negative  Negative   !: Data is abnormal    Imagin2024 5:00 PM     HISTORY/REASON FOR EXAM:  Pelvic pain        TECHNIQUE/EXAM DESCRIPTION:  Transabdominal and transvaginal pelvic ultrasound.     COMPARISON:   None     FINDINGS:  Both transabdominal and transvaginal scanning were performed to optimally visualize the pelvis.     UTERUS:  The uterus measures 3.83 cm x 7.78 cm x .  The uterine myometrium is within normal limits.  The endometrial echo complex measures 0.33 cm.  The endometrium is unremarkable in appearance and thickness for age and menstrual status.        OVARIES:  The right ovary measures 4.13 cm x 3.04 cm x 2.70 cm. Duplex Doppler examination of the right ovary shows normal waveforms.     The left ovary measures 3.05 cm x 2.28 cm x 2.55 cm. Duplex Doppler examination of the left ovary shows normal waveforms.           There is no free fluid seen.     IMPRESSION:     Normal transvaginal appearance of the pelvis.           Exam Ended: 24  5:51 PM L        A/ 4:17 PM     HISTORY/REASON FOR EXAM:  Pain  Abdominal pain     TECHNIQUE/EXAM DESCRIPTION AND NUMBER OF VIEWS:  Real-time sonography of the liver and biliary tree.     COMPARISON: None     FINDINGS:  The liver is normal in contour. There is no evidence of solid mass lesion. The liver measures 16.58 cm.     The gallbladder is normal. There is no evidence of cholelithiasis.  The gallbladder wall thickness  measures 2.40 mm. There is no pericholecystic fluid.  The common duct measures 3.90 mm.     The visualized pancreas is unremarkable.  The visualized aorta is normal in caliber.     Intrahepatic IVC is patent.     The portal vein is patent with hepatopetal flow. The MPV measures 1.28 cm.     The right kidney measures 10.69 cm.     There is no ascites.     IMPRESSION:       Patient Active Problem List   Diagnosis    Marijuana use - quit when she found out about pregnancy       Frances Rosen    33 y.o.        1. History of chlamydia - treated, ELIAS collected today.    2. Female genital prolapse, unspecified type  - not appreciated on exam today, perhaps temporarily resolved. Discussed types of various prolapse and etiology. Recommended daily kegel exercises. Offered pelvic floor physical therapy which patient is interested in. Referral request placed.    3. Pelvic pain - UPT negative; pelvic US normal; uterus was mildly tender on exam. Possible adenomyosis. Recommended trial of aygestin 5 mg PO daily. Side effects possible of irregular bleeding/spotting, mood changes, weight gain.    4. Cervical cancer screening  - f/u pap collected today.      RTC in 3 months for follow up.       Time spent: 30 minutes        Cha Carl M.D.    Obstetrics and Gynecology    :10 AM

## 2024-08-27 DIAGNOSIS — Z12.4 CERVICAL CANCER SCREENING: ICD-10-CM

## 2024-08-27 DIAGNOSIS — Z86.19 HISTORY OF CHLAMYDIA: ICD-10-CM

## 2024-08-29 LAB
C TRACH RRNA CVX QL NAA+PROBE: NEGATIVE
CYTOLOGIST CVX/VAG CYTO: NORMAL
CYTOLOGY CVX/VAG DOC CYTO: NORMAL
CYTOLOGY CVX/VAG DOC THIN PREP: NORMAL
HPV I/H RISK 4 DNA CVX QL PROBE+SIG AMP: NEGATIVE
N GONORRHOEA RRNA CVX QL NAA+PROBE: NEGATIVE
NOTE NL11727A: NORMAL
OTHER STN SPEC: NORMAL
STAT OF ADQ CVX/VAG CYTO-IMP: NORMAL

## 2024-10-16 ENCOUNTER — APPOINTMENT (OUTPATIENT)
Dept: URGENT CARE | Facility: PHYSICIAN GROUP | Age: 34
End: 2024-10-16
Payer: MEDICAID

## 2024-10-16 ENCOUNTER — OFFICE VISIT (OUTPATIENT)
Dept: URGENT CARE | Facility: PHYSICIAN GROUP | Age: 34
End: 2024-10-16
Payer: MEDICAID

## 2024-10-16 VITALS
TEMPERATURE: 97.8 F | OXYGEN SATURATION: 100 % | HEART RATE: 80 BPM | SYSTOLIC BLOOD PRESSURE: 120 MMHG | HEIGHT: 63 IN | DIASTOLIC BLOOD PRESSURE: 76 MMHG | RESPIRATION RATE: 13 BRPM | WEIGHT: 194.67 LBS | BODY MASS INDEX: 34.49 KG/M2

## 2024-10-16 DIAGNOSIS — H81.11 BENIGN PAROXYSMAL POSITIONAL VERTIGO OF RIGHT EAR: ICD-10-CM

## 2024-10-16 DIAGNOSIS — H61.92 LESION OF EXTERNAL EAR CANAL, LEFT: ICD-10-CM

## 2024-10-16 PROCEDURE — 99213 OFFICE O/P EST LOW 20 MIN: CPT | Performed by: FAMILY MEDICINE

## 2024-10-16 PROCEDURE — 3078F DIAST BP <80 MM HG: CPT | Performed by: FAMILY MEDICINE

## 2024-10-16 PROCEDURE — 3074F SYST BP LT 130 MM HG: CPT | Performed by: FAMILY MEDICINE

## 2024-10-16 RX ORDER — MECLIZINE HYDROCHLORIDE 25 MG/1
25 TABLET ORAL 3 TIMES DAILY PRN
Qty: 20 TABLET | Refills: 0 | Status: SHIPPED | OUTPATIENT
Start: 2024-10-16

## 2024-10-16 ASSESSMENT — ENCOUNTER SYMPTOMS
MYALGIAS: 0
WEIGHT LOSS: 0
EYE DISCHARGE: 0
VOMITING: 0
EYE REDNESS: 0

## 2024-10-16 ASSESSMENT — FIBROSIS 4 INDEX: FIB4 SCORE: 0.33

## 2024-11-07 ENCOUNTER — NON-PROVIDER VISIT (OUTPATIENT)
Dept: OCCUPATIONAL MEDICINE | Facility: CLINIC | Age: 34
End: 2024-11-07

## 2024-11-07 DIAGNOSIS — Z02.1 PRE-EMPLOYMENT DRUG SCREENING: ICD-10-CM

## 2024-11-07 PROCEDURE — 80305 DRUG TEST PRSMV DIR OPT OBS: CPT | Performed by: PREVENTIVE MEDICINE
